# Patient Record
Sex: FEMALE | Race: ASIAN | NOT HISPANIC OR LATINO | Employment: FULL TIME | URBAN - METROPOLITAN AREA
[De-identification: names, ages, dates, MRNs, and addresses within clinical notes are randomized per-mention and may not be internally consistent; named-entity substitution may affect disease eponyms.]

---

## 2018-09-10 ENCOUNTER — OFFICE VISIT - HEALTHEAST (OUTPATIENT)
Dept: FAMILY MEDICINE | Facility: CLINIC | Age: 58
End: 2018-09-10

## 2018-09-10 ENCOUNTER — COMMUNICATION - HEALTHEAST (OUTPATIENT)
Dept: TELEHEALTH | Facility: CLINIC | Age: 58
End: 2018-09-10

## 2018-09-10 DIAGNOSIS — M79.641 BILATERAL HAND PAIN: ICD-10-CM

## 2018-09-10 DIAGNOSIS — M79.642 BILATERAL HAND PAIN: ICD-10-CM

## 2018-09-10 DIAGNOSIS — L20.9 ATOPIC DERMATITIS: ICD-10-CM

## 2018-09-10 DIAGNOSIS — M54.2 NECK PAIN: ICD-10-CM

## 2018-09-10 DIAGNOSIS — Z12.11 SCREEN FOR COLON CANCER: ICD-10-CM

## 2018-09-10 DIAGNOSIS — Z12.31 VISIT FOR SCREENING MAMMOGRAM: ICD-10-CM

## 2018-09-10 ASSESSMENT — MIFFLIN-ST. JEOR: SCORE: 1071.07

## 2018-09-18 ENCOUNTER — HOSPITAL ENCOUNTER (OUTPATIENT)
Dept: MAMMOGRAPHY | Facility: CLINIC | Age: 58
Discharge: HOME | End: 2018-09-18

## 2018-09-18 DIAGNOSIS — Z12.31 VISIT FOR SCREENING MAMMOGRAM: ICD-10-CM

## 2018-10-23 ENCOUNTER — OFFICE VISIT - HEALTHEAST (OUTPATIENT)
Dept: FAMILY MEDICINE | Facility: CLINIC | Age: 58
End: 2018-10-23

## 2018-10-23 DIAGNOSIS — K21.9 ACID REFLUX: ICD-10-CM

## 2018-10-23 DIAGNOSIS — Z00.00 ROUTINE HEALTH MAINTENANCE: ICD-10-CM

## 2018-10-23 DIAGNOSIS — Z13.1 SCREENING FOR DIABETES MELLITUS: ICD-10-CM

## 2018-10-23 DIAGNOSIS — Z13.220 SCREENING FOR LIPID DISORDERS: ICD-10-CM

## 2018-10-23 DIAGNOSIS — Z12.4 SCREENING FOR MALIGNANT NEOPLASM OF CERVIX: ICD-10-CM

## 2018-10-23 DIAGNOSIS — L30.9 HAND DERMATITIS: ICD-10-CM

## 2018-10-23 LAB
ALBUMIN SERPL-MCNC: 3.9 G/DL (ref 3.5–5)
ALP SERPL-CCNC: 85 U/L (ref 45–120)
ALT SERPL W P-5'-P-CCNC: 21 U/L (ref 0–45)
ANION GAP SERPL CALCULATED.3IONS-SCNC: 11 MMOL/L (ref 5–18)
AST SERPL W P-5'-P-CCNC: 24 U/L (ref 0–40)
BILIRUB SERPL-MCNC: 0.4 MG/DL (ref 0–1)
BUN SERPL-MCNC: 16 MG/DL (ref 8–22)
CALCIUM SERPL-MCNC: 9.7 MG/DL (ref 8.5–10.5)
CHLORIDE BLD-SCNC: 107 MMOL/L (ref 98–107)
CHOLEST SERPL-MCNC: 232 MG/DL
CO2 SERPL-SCNC: 26 MMOL/L (ref 22–31)
CREAT SERPL-MCNC: 0.77 MG/DL (ref 0.6–1.1)
ERYTHROCYTE [DISTWIDTH] IN BLOOD BY AUTOMATED COUNT: 11.7 % (ref 11–14.5)
FASTING STATUS PATIENT QL REPORTED: YES
GFR SERPL CREATININE-BSD FRML MDRD: >60 ML/MIN/1.73M2
GLUCOSE BLD-MCNC: 98 MG/DL (ref 70–125)
HCT VFR BLD AUTO: 42 % (ref 35–47)
HDLC SERPL-MCNC: 65 MG/DL
HGB BLD-MCNC: 14 G/DL (ref 12–16)
LDLC SERPL CALC-MCNC: 147 MG/DL
MCH RBC QN AUTO: 28.8 PG (ref 27–34)
MCHC RBC AUTO-ENTMCNC: 33.4 G/DL (ref 32–36)
MCV RBC AUTO: 86 FL (ref 80–100)
PLATELET # BLD AUTO: 302 THOU/UL (ref 140–440)
PMV BLD AUTO: 7.2 FL (ref 7–10)
POTASSIUM BLD-SCNC: 3.7 MMOL/L (ref 3.5–5)
PROT SERPL-MCNC: 7.1 G/DL (ref 6–8)
RBC # BLD AUTO: 4.88 MILL/UL (ref 3.8–5.4)
SODIUM SERPL-SCNC: 144 MMOL/L (ref 136–145)
TRIGL SERPL-MCNC: 99 MG/DL
WBC: 5.6 THOU/UL (ref 4–11)

## 2018-10-23 ASSESSMENT — MIFFLIN-ST. JEOR: SCORE: 1093.64

## 2018-10-24 LAB
25(OH)D3 SERPL-MCNC: 22.5 NG/ML (ref 30–80)
HPV SOURCE: NORMAL
HUMAN PAPILLOMA VIRUS 16 DNA: NEGATIVE
HUMAN PAPILLOMA VIRUS 18 DNA: NEGATIVE
HUMAN PAPILLOMA VIRUS FINAL DIAGNOSIS: NORMAL
HUMAN PAPILLOMA VIRUS OTHER HR: NEGATIVE
SPECIMEN DESCRIPTION: NORMAL

## 2018-10-29 ENCOUNTER — COMMUNICATION - HEALTHEAST (OUTPATIENT)
Dept: FAMILY MEDICINE | Facility: CLINIC | Age: 58
End: 2018-10-29

## 2019-02-05 ENCOUNTER — OFFICE VISIT - HEALTHEAST (OUTPATIENT)
Dept: FAMILY MEDICINE | Facility: CLINIC | Age: 59
End: 2019-02-05

## 2019-02-05 DIAGNOSIS — M79.641 BILATERAL HAND PAIN: ICD-10-CM

## 2019-02-05 DIAGNOSIS — M79.642 BILATERAL HAND PAIN: ICD-10-CM

## 2019-02-05 DIAGNOSIS — N39.0 URINARY TRACT INFECTION WITHOUT HEMATURIA, SITE UNSPECIFIED: ICD-10-CM

## 2019-02-05 DIAGNOSIS — R30.0 DYSURIA: ICD-10-CM

## 2019-02-05 DIAGNOSIS — K21.9 ACID REFLUX: ICD-10-CM

## 2019-02-05 LAB
ALBUMIN UR-MCNC: ABNORMAL MG/DL
APPEARANCE UR: CLEAR
BILIRUB UR QL STRIP: NEGATIVE
COLOR UR AUTO: YELLOW
GLUCOSE UR STRIP-MCNC: NEGATIVE MG/DL
HGB UR QL STRIP: ABNORMAL
KETONES UR STRIP-MCNC: NEGATIVE MG/DL
LEUKOCYTE ESTERASE UR QL STRIP: ABNORMAL
NITRATE UR QL: NEGATIVE
PH UR STRIP: 7.5 [PH] (ref 5–8)
SP GR UR STRIP: 1.02 (ref 1–1.03)
UROBILINOGEN UR STRIP-ACNC: ABNORMAL

## 2019-02-06 LAB — BACTERIA SPEC CULT: NO GROWTH

## 2019-02-07 ENCOUNTER — COMMUNICATION - HEALTHEAST (OUTPATIENT)
Dept: FAMILY MEDICINE | Facility: CLINIC | Age: 59
End: 2019-02-07

## 2020-02-03 ENCOUNTER — COMMUNICATION - HEALTHEAST (OUTPATIENT)
Dept: FAMILY MEDICINE | Facility: CLINIC | Age: 60
End: 2020-02-03

## 2020-06-24 ENCOUNTER — OFFICE VISIT - HEALTHEAST (OUTPATIENT)
Dept: FAMILY MEDICINE | Facility: CLINIC | Age: 60
End: 2020-06-24

## 2020-06-24 DIAGNOSIS — Z12.31 VISIT FOR SCREENING MAMMOGRAM: ICD-10-CM

## 2020-06-24 DIAGNOSIS — L30.9 HAND DERMATITIS: ICD-10-CM

## 2020-06-24 DIAGNOSIS — Z12.11 SCREEN FOR COLON CANCER: ICD-10-CM

## 2020-06-24 DIAGNOSIS — K21.9 ACID REFLUX: ICD-10-CM

## 2020-06-24 DIAGNOSIS — M79.642 BILATERAL HAND PAIN: ICD-10-CM

## 2020-06-24 DIAGNOSIS — M79.641 BILATERAL HAND PAIN: ICD-10-CM

## 2020-06-24 RX ORDER — BETAMETHASONE DIPROPIONATE 0.5 MG/G
CREAM TOPICAL DAILY
Qty: 30 G | Refills: 2 | Status: SHIPPED | OUTPATIENT
Start: 2020-06-24 | End: 2022-06-10

## 2020-06-24 RX ORDER — FAMOTIDINE 20 MG/1
20 TABLET, FILM COATED ORAL 2 TIMES DAILY
Qty: 90 TABLET | Refills: 1 | Status: SHIPPED | OUTPATIENT
Start: 2020-06-24 | End: 2022-06-10

## 2021-02-01 ENCOUNTER — COMMUNICATION - HEALTHEAST (OUTPATIENT)
Dept: FAMILY MEDICINE | Facility: CLINIC | Age: 61
End: 2021-02-01

## 2021-02-01 DIAGNOSIS — M79.641 BILATERAL HAND PAIN: ICD-10-CM

## 2021-02-01 DIAGNOSIS — M79.642 BILATERAL HAND PAIN: ICD-10-CM

## 2021-02-02 RX ORDER — NAPROXEN 500 MG/1
500 TABLET ORAL 2 TIMES DAILY WITH MEALS
Qty: 60 TABLET | Refills: 0 | Status: SHIPPED | OUTPATIENT
Start: 2021-02-02 | End: 2022-07-08

## 2021-03-23 ENCOUNTER — AMBULATORY - HEALTHEAST (OUTPATIENT)
Dept: NURSING | Facility: CLINIC | Age: 61
End: 2021-03-23

## 2021-03-26 ENCOUNTER — OFFICE VISIT - HEALTHEAST (OUTPATIENT)
Dept: FAMILY MEDICINE | Facility: CLINIC | Age: 61
End: 2021-03-26

## 2021-03-26 DIAGNOSIS — R35.0 URINARY FREQUENCY: ICD-10-CM

## 2021-03-26 DIAGNOSIS — N30.00 ACUTE CYSTITIS WITHOUT HEMATURIA: ICD-10-CM

## 2021-03-26 LAB
ALBUMIN UR-MCNC: NEGATIVE G/DL
APPEARANCE UR: ABNORMAL
BACTERIA #/AREA URNS HPF: ABNORMAL /[HPF]
BILIRUB UR QL STRIP: NEGATIVE
COLOR UR AUTO: YELLOW
GLUCOSE UR STRIP-MCNC: NEGATIVE MG/DL
HGB UR QL STRIP: ABNORMAL
KETONES UR STRIP-MCNC: NEGATIVE MG/DL
LEUKOCYTE ESTERASE UR QL STRIP: ABNORMAL
NITRATE UR QL: NEGATIVE
PH UR STRIP: 7 [PH] (ref 5–8)
RBC #/AREA URNS AUTO: ABNORMAL HPF
SP GR UR STRIP: 1.02 (ref 1–1.03)
SQUAMOUS #/AREA URNS AUTO: ABNORMAL LPF
UROBILINOGEN UR STRIP-ACNC: ABNORMAL
WBC #/AREA URNS AUTO: ABNORMAL HPF
WBC CLUMPS #/AREA URNS HPF: PRESENT /[HPF]

## 2021-03-28 LAB — BACTERIA SPEC CULT: ABNORMAL

## 2021-04-13 ENCOUNTER — AMBULATORY - HEALTHEAST (OUTPATIENT)
Dept: NURSING | Facility: CLINIC | Age: 61
End: 2021-04-13

## 2021-06-01 VITALS — BODY MASS INDEX: 22.26 KG/M2 | WEIGHT: 121 LBS | HEIGHT: 62 IN

## 2021-06-02 VITALS — HEIGHT: 63 IN | WEIGHT: 125.1 LBS | BODY MASS INDEX: 22.16 KG/M2

## 2021-06-02 VITALS — BODY MASS INDEX: 22.93 KG/M2 | WEIGHT: 127.4 LBS

## 2021-06-04 VITALS
DIASTOLIC BLOOD PRESSURE: 69 MMHG | SYSTOLIC BLOOD PRESSURE: 123 MMHG | OXYGEN SATURATION: 99 % | HEART RATE: 72 BPM | WEIGHT: 116.3 LBS | BODY MASS INDEX: 20.93 KG/M2

## 2021-06-05 VITALS
WEIGHT: 122 LBS | DIASTOLIC BLOOD PRESSURE: 80 MMHG | RESPIRATION RATE: 14 BRPM | BODY MASS INDEX: 21.96 KG/M2 | SYSTOLIC BLOOD PRESSURE: 122 MMHG | HEART RATE: 79 BPM | TEMPERATURE: 98.9 F | OXYGEN SATURATION: 99 %

## 2021-06-09 NOTE — PROGRESS NOTES
Assessment:   1. Hand dermatitis  Improved result with current medication. Recommend that patient continue with current topical corticosteroid and use hand lotion regularly. Patient verbalized understanding.   - betamethasone dipropionate (DIPROLENE) 0.05 % cream; Apply topically daily. To affected areas.  Dispense: 30 g; Refill: 2    2. Bilateral hand pain  Continue current NSAID  - naproxen (NAPROSYN) 500 MG tablet; Take 1 tablet (500 mg total) by mouth 2 (two) times a day with meals.  Dispense: 180 tablet; Refill: 1    3. Acid reflux  Stop zantac and start famotidine   - famotidine (PEPCID) 20 MG tablet; Take 1 tablet (20 mg total) by mouth 2 (two) times a day.  Dispense: 90 tablet; Refill: 1    4. Screen for colon cancer  Discussed need for screening  - Cologuard    5. Visit for screening mammogram  Discussed need for screening   - Mammo Screening Bilateral; Future     Plan:   Medications: topical steroid: corticosteroid.  Verbal patient instruction given.  Follow up in 3 weeks.     Subjective:   Jenni Bucio is a 59 y.o. female who presents for evaluation of a rash involving the hand. Rash started a few years ago. Lesions are clear, and raised in texture. Rash gets worse when dry and its itchy. Associated symptoms: none. Patient denies: abdominal pain, arthralgia, cough, decrease in appetite, decrease in energy level, fever, irritability, myalgia, nausea, sore throat and vomiting. Patient has not had contacts with similar rash. Patient has not had new exposures (soaps, lotions, laundry detergents, foods, medications, plants, insects or animals).    The following portions of the patient's history were reviewed and updated as appropriate: allergies, current medications, past family history, past medical history, past social history, past surgical history and problem list.    Review of Systems  A 12 point comprehensive review of systems was negative except as noted.      Objective:      /69   Pulse 72    Wt 116 lb 4.8 oz (52.8 kg)   SpO2 99%   BMI 20.93 kg/m    General:  alert, appears stated age and cooperative   Skin:  normal and dry scaley rash on both hands.

## 2021-06-16 NOTE — PROGRESS NOTES
Impression:  Acute cystitis.  No evidence for pyelonephritis or sepsis.    Plan:  Macrobid for 5 days, fluids, urine culture will be sent, follow-up with primary care      Chief Complaint:  Chief Complaint   Patient presents with     Fever     felt hot had uti in past and had a fever     Urinary Frequency     pt states she gets uti freq and would like to know if next time she feels like today if she can call in and just get a rx         HPI:   Jenni Bucio is a 60 y.o. female who presents to this clinic for the evaluation of urinary symptoms.  Patient complains of 2-day history of dysuria urgency and frequency.  No hematuria.  The first day she felt warm she like she had a fever but did not measure her temperature.  No fever since then.  No nausea or vomiting.  No abdominal pain or back pain.  She has had urinary tract infections in the past about 1/year and these symptoms remind her of her previous urinary tract infections.  History is obtained through a [place]  over the phone      PMH:   No past medical history on file.  No past surgical history on file.      ROS:  All other systems negative    Meds:    Current Outpatient Medications:      betamethasone dipropionate (DIPROLENE) 0.05 % cream, Apply topically daily. To affected areas., Disp: 30 g, Rfl: 2     famotidine (PEPCID) 20 MG tablet, Take 1 tablet (20 mg total) by mouth 2 (two) times a day., Disp: 90 tablet, Rfl: 1     naproxen (NAPROSYN) 500 MG tablet, Take 1 tablet (500 mg total) by mouth 2 (two) times a day with meals., Disp: 60 tablet, Rfl: 0     ranitidine (ZANTAC) 150 MG tablet, Take 1 tablet (150 mg total) by mouth 2 (two) times a day., Disp: 60 tablet, Rfl: 1        Social:  Social History     Socioeconomic History     Marital status: Single     Spouse name: Not on file     Number of children: Not on file     Years of education: Not on file     Highest education level: Not on file   Occupational History     Not on file   Social Needs      Financial resource strain: Not on file     Food insecurity     Worry: Not on file     Inability: Not on file     Transportation needs     Medical: Not on file     Non-medical: Not on file   Tobacco Use     Smoking status: Never Smoker     Smokeless tobacco: Never Used   Substance and Sexual Activity     Alcohol use: No     Drug use: Not on file     Sexual activity: Never   Lifestyle     Physical activity     Days per week: Not on file     Minutes per session: Not on file     Stress: Not on file   Relationships     Social connections     Talks on phone: Not on file     Gets together: Not on file     Attends Congregation service: Not on file     Active member of club or organization: Not on file     Attends meetings of clubs or organizations: Not on file     Relationship status: Not on file     Intimate partner violence     Fear of current or ex partner: Not on file     Emotionally abused: Not on file     Physically abused: Not on file     Forced sexual activity: Not on file   Other Topics Concern     Not on file   Social History Narrative     Not on file         Physical Exam:  Vitals:    03/26/21 1434   BP: 122/80   Patient Site: Right Arm   Patient Position: Sitting   Cuff Size: Adult Regular   Pulse: 79   Resp: 14   Temp: 98.9  F (37.2  C)   TempSrc: Oral   SpO2: 99%   Weight: 122 lb (55.3 kg)      Vital signs reviewed  Eyes: PERRL, EOMI  Head: Atraumatic and normocephalic  Abdomen: Nontender without mass, no CVA tenderness  Extremities: No tenderness or deformity  Skin: No lesions or rash  Neuro: Normal motor and sensory function in all extremities  Psych: Awake, alert, normally responsive      Results:    Recent Results (from the past 24 hour(s))   Urinalysis-UC if Indicated   Result Value Ref Range    Color, UA Yellow Colorless, Yellow, Straw, Light Yellow    Clarity, UA Slightly Cloudy (!) Clear    Glucose, UA Negative Negative    Protein, UA Negative Negative    Bilirubin, UA Negative Negative     Urobilinogen, UA 0.2 E.U./dL 0.2 E.U./dL, 1.0 E.U./dL    pH, UA 7.0 5.0 - 8.0    Blood, UA Trace (!) Negative    Ketones, UA Negative Negative    Nitrite, UA Negative Negative    Leukocytes, UA Moderate (!) Negative    Specific Gravity, UA 1.025 1.005 - 1.030    RBC, UA 0-2 None Seen, 0-2 hpf    WBC, UA 10-25 (!) None Seen, 0-5 hpf    WBC Clumps Present (!) None Seen    Bacteria, UA Few (!) None Seen    Squam Epithel, UA 0-5 None Seen, 0-5 lpf       No results found.      Tom Mccullough MD

## 2021-06-18 NOTE — PATIENT INSTRUCTIONS - HE
"Patient Instructions by Tom Mccullough MD at 3/26/2021  2:30 PM     Author: Tom Mccullough MD Service: -- Author Type: Physician    Filed: 3/26/2021  3:02 PM Encounter Date: 3/26/2021 Status: Signed    : Tom Mccullough MD (Physician)         Patient Education     Tess?m Trùng Bàng Maris, Ph? N? [Bladder Infection: Female, Adult]  Tess?m trùng bàng maris (\"viêm bàng maris\" ho?c \"UTI\") th? ?ng gây nên vi?c mu? n ?i ti ?u không ng?t và rát khi ti?u ti?n. N? ?c ti?u có th? ? ?c, có mùi ho?c s?m. Có th? ?au b ? ng d? ?i. S? tess?m trùng bàng maris x?y ra khi vi khu?n t? vùng âm ? ?o xâm nh?p vào l? h? c?a bàng maris (ni? u ? ?o). ?i ?u này có th? x?y ra do giao h?p, m?c áo qu?n ch?t, m? t n? ?c ho?c các y?u t? khác.    Ch?m Sóc T?i Anne:  1. U?ng tess? u n? ?c (ít nh?t là 6-8 ly m?i ngày), tr? khi quý v? ph?i h?n ch? ch?t l?ng vì các lý do y david khác. ?i ?u này s? ? ?y thu?c vào anuj h? th?ng ni?u c?a quý v? và t?ng vi khu?n ra kh? i c? th ? c?a quý v?.  2. Tránh giao h? p cho ? ?n khi các tri?u ch?ng c?a quý v? ? ã bi?n m?t.  3. Tránh cà phê in, r? ?u và th? c ?n có anne v?. Nh? ng ?i ?u này có th? kích thích bàng maris.  4. Tess?m trùng bàng maris ?? ?c ?i ?u tr? b?ng thu?c tr? sinh. Quý v? c?ng có th? ?? ?c cho dùng Pyridium (g?i ana tên hi? u th? ?ng là phenazopyridine) ? ? gi?m c?m giác rát. Thu?c này s? làm cho n? ?c ti?u có màu cam t ?? i. N? ?c ti?u màu cam có th? gây v?t màu dính trên áo qu?n. Quý v? có th? m ang b?ng v ? sinh hay b ? ng lót ? ? b?o v? áo qu?n.  Phòng Ng?a Các S? Tess? M TRÙNG ANUJ T??NG FERRARA:  1. Luôn appiah chùi t? tr? ?c ra maicol maicol khi ? ?i ti?n.  2. Gi? cho vùng sinh d?c s?ch và khô.  3. U?ng th?t tess?u ch?t l?ng m?i ngày ? ? tránh m? t n? ?c anuj c ? th ?.  4. C? mireya ng? ?i b?n tình ph?i r?a ráy tr??c khi giao h?p.  5. Ti?u ti?n ngay maicol khi giao h?p ? ? làm s?ch bàng maris.  6. M? c ? ? lót b?ng cô tông ho?c v? kéo ngang b?ng b?ng cô tông ; tránh ? ?ng m?c qu?n ch?t  7. N?u quý " v? ?ang dùng thu ?c ng?a armin và b? laura?m trùng bàng maris th? ?ng xuyên, hãy th?o vasile?n v?i bác s? c?a quý v?.  Ti?p T?c Manuel Dõi:  Tr? l? i c? s ? này ho? c ? ?n khám bác s? c?a quý v? n?u T?T C? các tri?u ch?ng không h?t maicol ba ngày ?i ?u tr?.  N?u x?y ra b?t c? ?i?u nào maicol ?ây hãy L?P T?C ?I?U TR? Y T?:    Sô?t 100.4 F (38 C) ho??c petersen h?n, ho??c manuel chi? dâ?n cu?a ba?c si? cu?a cresencio? vi?    Không ? ? h?n vào ngày th ? ba ?? ? c ?i ?u tr?    ?au l?ng ho ? c ?au b ? ng anne t?ng    Ói m?a laura?u l?n; không c?m ?? ?c thu?c    Latanya y?u, chóng m?t ho?c ng?t x?u    Ti?t d? ch âm ? ?o    ?au , ? ? ho? c s?ng mép âm h? ( vùng âm ? ?o bên ngoài)  Date Last Reviewed: 4/8/2014 2000-2017 [employer]. [address] [place] [address], [place] [address]. All rights reserved. This information is not intended as a substitute for professional medical care. Always follow your healthcare professional's instructions.

## 2021-06-20 NOTE — PROGRESS NOTES
Assessment/Plan:     1. Bilateral hand pain  I suspect osteoarthritis given the location of the pain as well as her work environment.  Will start patient on Naproxen twice daily with food.  Stop the Ibuprofen.  Stretching before and after work.   - naproxen (NAPROSYN) 500 MG tablet; Take 1 tablet (500 mg total) by mouth 2 (two) times a day with meals.  Dispense: 180 tablet; Refill: 1    2. Neck pain  Given her hand pain, there could be some symptoms of stenosis.  Will start with NSAIDs, heat, and stretching.  If pain continues, will consider a cervical MRI.    3. Atopic dermatitis  Encourage good hydration of the skin, especially if she is washing her hands and been in contact with water frequently.  We will trial some TCA ointment to the thumb joint.  - triamcinolone (KENALOG) 0.1 % ointment; Apply topically 2 (two) times a day.  Dispense: 30 g; Refill: 0    4. Screen for colon cancer  - Ambulatory referral for Colonoscopy    5. Visit for screening mammogram  - Mammo Screening Bilateral; Future      Subjective:     Jenni Bucio is a 58 y.o. female accompanied by an  who presents with complaints of bilateral hand pain and neck pain.  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  Patient complains of pain in the distal joint of the right thumb.  She also has pain in the MCP joint of her left index finger.  Pain is intermittent, and some days are better than others.  She has noticed some swelling of the joints.  At times, she has difficulty grasping things with her left hand.  She is left-hand dominant.  Patient does not have pain in the left thumb, but complains of red and itchy skin to this area.  She has tried a variety of creams and lotions without significant improvement.  Patient denies any pain in the rest of her fingers or her wrists.  Her neck pain is intermittent.  States it starts at the base of her skull, and radiates into the upper  "trapezius muscles.  States it feels very tight, stiff, and painful.  Massage is very helpful.  She will sometimes wake up with numbness in her hands, and this happens more often at night.  Patient is currently taking ibuprofen as needed for both the neck and hand pain.  It does help a little, and she is not needing to take on a daily basis.    Patient has not been in for a physical in a number of years.  It does look like she had a mammogram about 2 years ago.        The following portions of the patient's history were reviewed and updated as appropriate: allergies, current medications, past family history, past medical history, past social history, past surgical history and problem list.    Review of Systems  A comprehensive review of systems was performed and was otherwise negative    Objective:     /72 (Patient Site: Right Arm, Patient Position: Sitting, Cuff Size: Adult Regular)  Pulse 70  Ht 5' 2.25\" (1.581 m)  Wt 121 lb (54.9 kg)  BMI 21.95 kg/m2    General Appearance: Alert, cooperative, no distress, appears stated age  Neck: Cervical spine is straight and nontender. Mild pain to the cervical paraspinal muscles. Normal ROM.   Extremities: Pain palpated to the right DIP joint of the thumb - no significant swelling or redness.  Pain and swelling to the left, 2nd MCP joint.  strength grossly normal and equal bilaterally. No wrist pain. Normal ROM.   Skin: dry, erythematous, and excoriated skin to the left dorsal thumb. No drainage or swelling.      Vernell Wheatley NP-C    "

## 2021-06-20 NOTE — LETTER
Letter by Vernell Wheatley NP at      Author: Vernell Wheatley NP Service: -- Author Type: --    Filed:  Encounter Date: 2/3/2020 Status: (Other)         Jenni Bucio  [address]  [place] [address]             February 3, 2020         Dear Ms. Bucio,    Our records show that you are due for a colonoscopy.  We do want to inform you that there are a couple of other options besides the traditional colonoscopy that we offer.  If you would like to do the traditional colonoscopy, please contact a [facility] near you.  If you would like to do one of the other options available to you, please let you primary doctor know and they will get that ordered, or let you know if an office visit is needed. If you have had any of these done at another facility, please arrange for us to receive those records so we can update your chart.      Please call with questions or contact us using [facility].    Sincerely,        Electronically signed by Vernell Wheatley NP

## 2021-06-21 NOTE — PROGRESS NOTES
FEMALE PREVENTATIVE EXAM    Assessment and Plan:     1. Routine health maintenance  - 2(CBC w/o Differential)  - Vitamin D, Total (25-Hydroxy)    2. Screening for diabetes mellitus  - Comprehensive Metabolic Panel    3. Screening for lipid disorders  - Lipid Frederic FASTING    4. Acid reflux  Epigastric pain and bloating possibly associated acid reflux.  Trial of Zantac twice daily before meals.  Discussed dietary and lifestyle modifications.    - ranitidine (ZANTAC) 150 MG tablet; Take 1 tablet (150 mg total) by mouth 2 (two) times a day.  Dispense: 60 tablet; Refill: 1    5. Screening for malignant neoplasm of cervix  - Gynecologic Cytology (PAP Smear)    6. Hand dermatitis  Some improvement with TCA cream.  Will trial betamethasone once daily.    - betamethasone dipropionate (DIPROLENE) 0.05 % cream; Apply topically daily. To affected areas.  Dispense: 30 g; Refill: 0      Next follow up:  Return in about 1 year (around 10/23/2019).    Immunization Review  Adult Imm Review: No immunizations due today    I discussed the following with the patient:   Adult Healthy Living: Importance of regular exercise  Healthy nutrition  Getting adequate sleep    I have had an Advance Directives discussion with the patient.    Subjective:   Chief Complaint: Jenni Bucio is an 58 y.o. female here for a preventative health visit.     HPI: Patient works at a nail salon.  The last time I saw her, she was experiencing hand pain and a rash on her hand.  She was started on naproxen for the hand pain.  This has been very beneficial.  She continues to have intermittent skin dryness and redness to her thumb areas.  She tried the triamcinolone cream, but this did not seem to clear it up.    Patient complains of epigastric pain, abdominal bloating, and some heartburn symptoms for the last 2 months or so.  States she will feel bloated before and after eating.  Denies any cough or sore throat.  No treatments tried at home.    She reports a  "lot of mucus in her throat that she feels she needs to clear up.  She denies any fever, sinus congestion, sore throat, or chronic cough.  No fevers.  Symptoms present for the last 3 weeks or so.  No shortness of breath or chest pain.    Patient believes her last Pap was approximately 18 years ago.  She is no longer having menstrual periods.  Denies any abnormal vaginal discharge or bleeding.    Healthy Habits  Are you taking a daily aspirin? No  Do you typically exercising at least 40 min, 3-4 times per week?  NO  Do you usually eat at least 4 servings of fruit and vegetables a day, include whole grains and fiber and avoid regularly eating high fat foods? NO  Have you had an eye exam in the past two years? NO  Do you see a dentist twice per year? NO  Do you have any concerns regarding sleep? YES    Safety Screen  If you own firearms, are they secured in a locked gun cabinet or with trigger locks? The patient does not own any firearms  Do you feel you are safe where you are living?: Yes (10/23/2018  7:47 AM)  Do you feel you are safe in your relationship(s)?: Yes (10/23/2018  7:47 AM)    Review of Systems:  Please see above.  The rest of the review of systems are negative for all systems.     Pap History:   No - age 30-65 PAP every 3 years recommended  Cancer Screening       Status Date      PAP SMEAR Overdue 8/5/1989     COLONOSCOPY Overdue 8/5/2009     MAMMOGRAM Next Due 9/18/2020      Done 9/18/2018 MAMMO SCREENING BILATERAL     Patient has more history with this topic...          Patient Care Team:  Vernell Wheatley NP as PCP - General (Family Medicine)        History     Reviewed By Date/Time Sections Reviewed    Justine Saldana MA 10/23/2018  7:47 AM Tobacco            Objective:   Vital Signs:   Visit Vitals     /68 (Patient Site: Right Arm, Patient Position: Sitting, Cuff Size: Adult Regular)     Pulse 80     Temp 98.2  F (36.8  C) (Oral)     Ht 5' 2.5\" (1.588 m)     Wt 125 lb 1.6 oz (56.7 kg)     BMI " 22.52 kg/m2          PHYSICAL EXAM  General Appearance: Alert, cooperative, no distress, appears stated age  Head: Normocephalic, without obvious abnormality, atraumatic  Eyes: PERRL, conjunctiva/corneas clear, EOM's intact  Ears: Normal TM's and external ear canals, both ears  Nose: Nares normal, septum midline,mucosa normal, no drainage  Throat: Lips, mucosa, and tongue normal; teeth and gums normal  Neck: Supple, symmetrical, trachea midline, no adenopathy;  thyroid: not enlarged, symmetric, no tenderness/mass/nodules  Back: no CVA tenderness  Lungs: Clear to auscultation bilaterally, respirations unlabored  Breasts: No breast masses, tenderness, asymmetry, or nipple discharge.  Heart: Regular rate and rhythm, S1 and S2 normal, no murmur, rub, or gallop  Abdomen: Soft, non-tender, bowel sounds active all four quadrants,  no masses, no organomegaly  Pelvic: Normally developed genitalia with no external lesions or eruptions. Vagina and cervix show no lesions, inflammation, discharge or tenderness. No cystocele, No rectocele. No adnexal mass or tenderness.  Extremities: Extremities normal, atraumatic, no cyanosis or edema  Skin: Skin color, texture, turgor normal, or lesions. Erythematous, patchy skin to the bilateral skin of thumbs. No drainage or lesions.   Lymph nodes: Cervical, supraclavicular, and axillary nodes normal  Neurologic: Normal   Psychologic: appropriate affective, answers all of my questions appropriately. No hallucinations, delusion, or suicidal ideations.    RODRIGO Arevalo

## 2021-06-23 NOTE — TELEPHONE ENCOUNTER
----- Message from Vernell Wheatley NP sent at 2/7/2019  7:42 AM CST -----  Please call patient.    Her urine culture was negative.  If her symptoms are improving, please instruct her to finish the antibiotics.  Follow up with any continued symptoms after she completes this.    Vernell Wheatley NP

## 2021-06-23 NOTE — PROGRESS NOTES
Assessment/Plan:     1. Urinary tract infection without hematuria, site unspecified  UA positive.  Prescribed Macrobid two times a day x 5 days.  Urine culture pending; will notify if a change in antibiotic is indicated.  Push fluids.  Stop the OTC Urimax.   - Culture, Urine  - nitrofurantoin, macrocrystal-monohydrate, (MACROBID) 100 MG capsule; Take 1 capsule (100 mg total) by mouth 2 (two) times a day for 5 days.  Dispense: 10 capsule; Refill: 0    2. Dysuria  - Urinalysis-UC if Indicated    3. Bilateral hand pain  Naproxen working well; continue.   - naproxen (NAPROSYN) 500 MG tablet; Take 1 tablet (500 mg total) by mouth 2 (two) times a day with meals.  Dispense: 180 tablet; Refill: 1    4. Acid reflux  Zantac working well; continue.  - ranitidine (ZANTAC) 150 MG tablet; Take 1 tablet (150 mg total) by mouth 2 (two) times a day.  Dispense: 60 tablet; Refill: 1        Subjective:     Jenni Bucio is a 58 y.o. female who presents with complaints of dysuria x 4 days.  Associated symptoms include lower abdominal pain, which is worse after urination.  Denies any fever, hematuria, or urinary frequency/urgency.  No back pain or N/V/D.  Patient was treated for a UTI last summer.  She has been hydrating and taking an OTC medication called Urimax.    Patient is also requesting refills of her Naproxen and Zantac.  She is taking the Naproxen as needed for bilateral hand pain.  This is working very well for her.  She enjoys sewing and also works as a .  Patient utilizes the Zantac for acid reflux; working well.     The following portions of the patient's history were reviewed and updated as appropriate: allergies, current medications.    Review of Systems  A comprehensive review of systems was performed and was otherwise negative    Objective:     /70   Pulse 80   Temp 98.4  F (36.9  C)   Wt 127 lb 6.4 oz (57.8 kg)   BMI 22.93 kg/m      General Appearance: Alert, cooperative, no distress, appears  stated age  Back: no CVA tenderness  Lungs: Clear to auscultation bilaterally, respirations unlabored  Heart: Regular rate and rhythm, S1 and S2 normal, no murmur, rub, or gallop   Abdomen: Soft, suprapubic tenderness, bowel sounds active all four quadrants,  no masses, no organomegaly      Vernell Wheatley, NP

## 2022-06-10 ENCOUNTER — OFFICE VISIT (OUTPATIENT)
Dept: INTERNAL MEDICINE | Facility: CLINIC | Age: 62
End: 2022-06-10
Payer: COMMERCIAL

## 2022-06-10 VITALS
HEART RATE: 70 BPM | SYSTOLIC BLOOD PRESSURE: 112 MMHG | WEIGHT: 112.7 LBS | TEMPERATURE: 98.1 F | DIASTOLIC BLOOD PRESSURE: 62 MMHG | BODY MASS INDEX: 20.28 KG/M2 | OXYGEN SATURATION: 99 %

## 2022-06-10 DIAGNOSIS — R63.4 WEIGHT LOSS: ICD-10-CM

## 2022-06-10 DIAGNOSIS — F41.9 ANXIETY: ICD-10-CM

## 2022-06-10 DIAGNOSIS — R00.2 PALPITATIONS: ICD-10-CM

## 2022-06-10 DIAGNOSIS — R07.89 ATYPICAL CHEST PAIN: ICD-10-CM

## 2022-06-10 DIAGNOSIS — R53.82 CHRONIC FATIGUE: Primary | ICD-10-CM

## 2022-06-10 DIAGNOSIS — K21.9 GASTROESOPHAGEAL REFLUX DISEASE WITHOUT ESOPHAGITIS: ICD-10-CM

## 2022-06-10 DIAGNOSIS — K21.00 GASTROESOPHAGEAL REFLUX DISEASE WITH ESOPHAGITIS WITHOUT HEMORRHAGE: ICD-10-CM

## 2022-06-10 LAB
ALBUMIN SERPL-MCNC: 3.7 G/DL (ref 3.5–5)
ALBUMIN UR-MCNC: NEGATIVE MG/DL
ALP SERPL-CCNC: 77 U/L (ref 45–120)
ALT SERPL W P-5'-P-CCNC: 12 U/L (ref 0–45)
ANION GAP SERPL CALCULATED.3IONS-SCNC: 11 MMOL/L (ref 5–18)
APPEARANCE UR: CLEAR
AST SERPL W P-5'-P-CCNC: 18 U/L (ref 0–40)
ATRIAL RATE - MUSE: 66 BPM
BACTERIA #/AREA URNS HPF: ABNORMAL /HPF
BASOPHILS # BLD AUTO: 0 10E3/UL (ref 0–0.2)
BASOPHILS NFR BLD AUTO: 1 %
BILIRUB SERPL-MCNC: 0.5 MG/DL (ref 0–1)
BILIRUB UR QL STRIP: NEGATIVE
BUN SERPL-MCNC: 19 MG/DL (ref 8–22)
C REACTIVE PROTEIN LHE: 0.2 MG/DL (ref 0–0.8)
CALCIUM SERPL-MCNC: 9.7 MG/DL (ref 8.5–10.5)
CHLORIDE BLD-SCNC: 107 MMOL/L (ref 98–107)
CO2 SERPL-SCNC: 27 MMOL/L (ref 22–31)
COLOR UR AUTO: YELLOW
CREAT SERPL-MCNC: 0.78 MG/DL (ref 0.6–1.1)
DEPRECATED CALCIDIOL+CALCIFEROL SERPL-MC: 26 UG/L (ref 20–75)
DIASTOLIC BLOOD PRESSURE - MUSE: NORMAL MMHG
EOSINOPHIL # BLD AUTO: 0.1 10E3/UL (ref 0–0.7)
EOSINOPHIL NFR BLD AUTO: 1 %
ERYTHROCYTE [DISTWIDTH] IN BLOOD BY AUTOMATED COUNT: 11.8 % (ref 10–15)
ERYTHROCYTE [SEDIMENTATION RATE] IN BLOOD BY WESTERGREN METHOD: 9 MM/HR (ref 0–20)
GFR SERPL CREATININE-BSD FRML MDRD: 85 ML/MIN/1.73M2
GLUCOSE BLD-MCNC: 95 MG/DL (ref 70–125)
GLUCOSE UR STRIP-MCNC: NEGATIVE MG/DL
HCT VFR BLD AUTO: 43.9 % (ref 35–47)
HGB BLD-MCNC: 14.4 G/DL (ref 11.7–15.7)
HGB UR QL STRIP: ABNORMAL
IMM GRANULOCYTES # BLD: 0 10E3/UL
IMM GRANULOCYTES NFR BLD: 0 %
INTERPRETATION ECG - MUSE: NORMAL
KETONES UR STRIP-MCNC: NEGATIVE MG/DL
LEUKOCYTE ESTERASE UR QL STRIP: ABNORMAL
LYMPHOCYTES # BLD AUTO: 1.6 10E3/UL (ref 0.8–5.3)
LYMPHOCYTES NFR BLD AUTO: 22 %
MCH RBC QN AUTO: 29.2 PG (ref 26.5–33)
MCHC RBC AUTO-ENTMCNC: 32.8 G/DL (ref 31.5–36.5)
MCV RBC AUTO: 89 FL (ref 78–100)
MONOCYTES # BLD AUTO: 0.4 10E3/UL (ref 0–1.3)
MONOCYTES NFR BLD AUTO: 6 %
NEUTROPHILS # BLD AUTO: 5.1 10E3/UL (ref 1.6–8.3)
NEUTROPHILS NFR BLD AUTO: 70 %
NITRATE UR QL: NEGATIVE
P AXIS - MUSE: 71 DEGREES
PH UR STRIP: 6.5 [PH] (ref 5–8)
PLATELET # BLD AUTO: 293 10E3/UL (ref 150–450)
POTASSIUM BLD-SCNC: 4 MMOL/L (ref 3.5–5)
PR INTERVAL - MUSE: 120 MS
PROT SERPL-MCNC: 7.4 G/DL (ref 6–8)
QRS DURATION - MUSE: 74 MS
QT - MUSE: 426 MS
QTC - MUSE: 446 MS
R AXIS - MUSE: 30 DEGREES
RBC # BLD AUTO: 4.93 10E6/UL (ref 3.8–5.2)
RBC #/AREA URNS AUTO: ABNORMAL /HPF
SODIUM SERPL-SCNC: 145 MMOL/L (ref 136–145)
SP GR UR STRIP: 1.01 (ref 1–1.03)
SQUAMOUS #/AREA URNS AUTO: ABNORMAL /LPF
SYSTOLIC BLOOD PRESSURE - MUSE: NORMAL MMHG
T AXIS - MUSE: 16 DEGREES
TSH SERPL DL<=0.005 MIU/L-ACNC: 1.42 UIU/ML (ref 0.3–5)
UROBILINOGEN UR STRIP-ACNC: 0.2 E.U./DL
VENTRICULAR RATE- MUSE: 66 BPM
WBC # BLD AUTO: 7.2 10E3/UL (ref 4–11)
WBC #/AREA URNS AUTO: ABNORMAL /HPF

## 2022-06-10 PROCEDURE — 80050 GENERAL HEALTH PANEL: CPT | Performed by: INTERNAL MEDICINE

## 2022-06-10 PROCEDURE — 93010 ELECTROCARDIOGRAM REPORT: CPT | Performed by: INTERNAL MEDICINE

## 2022-06-10 PROCEDURE — 86140 C-REACTIVE PROTEIN: CPT | Performed by: INTERNAL MEDICINE

## 2022-06-10 PROCEDURE — 85652 RBC SED RATE AUTOMATED: CPT | Performed by: INTERNAL MEDICINE

## 2022-06-10 PROCEDURE — 99213 OFFICE O/P EST LOW 20 MIN: CPT | Performed by: INTERNAL MEDICINE

## 2022-06-10 PROCEDURE — 82306 VITAMIN D 25 HYDROXY: CPT | Performed by: INTERNAL MEDICINE

## 2022-06-10 PROCEDURE — 81001 URINALYSIS AUTO W/SCOPE: CPT | Performed by: INTERNAL MEDICINE

## 2022-06-10 PROCEDURE — 36415 COLL VENOUS BLD VENIPUNCTURE: CPT | Performed by: INTERNAL MEDICINE

## 2022-06-10 PROCEDURE — 93005 ELECTROCARDIOGRAM TRACING: CPT | Performed by: INTERNAL MEDICINE

## 2022-06-10 RX ORDER — FAMOTIDINE 20 MG/1
20 TABLET, FILM COATED ORAL 2 TIMES DAILY
Qty: 90 TABLET | Refills: 1 | Status: SHIPPED | OUTPATIENT
Start: 2022-06-10 | End: 2022-07-28

## 2022-06-10 ASSESSMENT — PATIENT HEALTH QUESTIONNAIRE - PHQ9
SUM OF ALL RESPONSES TO PHQ QUESTIONS 1-9: 2
SUM OF ALL RESPONSES TO PHQ QUESTIONS 1-9: 2
10. IF YOU CHECKED OFF ANY PROBLEMS, HOW DIFFICULT HAVE THESE PROBLEMS MADE IT FOR YOU TO DO YOUR WORK, TAKE CARE OF THINGS AT HOME, OR GET ALONG WITH OTHER PEOPLE: NOT DIFFICULT AT ALL

## 2022-06-10 NOTE — PATIENT INSTRUCTIONS
Symptom directed visit for this historically healthy 61-year-old [place] woman who comes to clinic accompanied by her sister, and has been feeling unwell for the past 3 months and has lost about 20 pounds of weight.    Ms. Mckeon has a history of gastroesophageal reflux and, and also osteoarthritis of her hands and probably some involvement of the neck.  She works in a nail salon and as a seamstress.    She reports that her health started to change about 3 months ago, when she has been experiencing     Fatigue, dizziness, and intermittent pain in the right side of the chest and into the shoulder, some shortness of breath, poor sleeping, and most concerningly has lost she says 20 pounds    Her physical examination is unremarkable, although I do note that she is thin.  Her thyroid seems palpably normal.    I told her that I want to run a broad set of tests to check her organ systems, so we will start with an electrocardiogram, comprehensive metabolic panel, blood cell counts, TSH thyroid test, vitamin D level, sedimentation rate, urinalysis (has history of UTI)    I would like to see her back in the clinic in 2 or 3 weeks to review these test results.    She also tell me that she is experiencing  Stress and anxiety, from some sort of family matter  which she declined to elaborate on.  As I asked her about this topic, she did become a bit tearful.    Sleep difficulty, which could be from anxiety or stress, but of course we do need to evaluate for potential medical causes    History of elevated cholesterol, with an LDL of 147 measured in 2018  No history of smoking, she reports alcohol consumption 0    Gastroesophageal reflux, for which she takes famotidine, and she does report some intermittent difficulty with swallowing    Osteoarthritis of her hands, with visible bony hypertrophy that I can see, and also some neck pain which I believe probably represents some degenerative disc disease  Some of this could be overuse  from her occupation which is also lot of manipulation of the hands  She has been using naproxen, which I would like her to minimize use, because it can be irritating to the stomach.    Immunization History   Administered Date(s) Administered    COVID-19,PF,Pfizer (12+ Yrs) 03/23/2021, 04/13/2021    COVID-19,PF,Pfizer 12+ Yrs (2022 and After) 05/02/2022    DT (PEDS <7y) 03/27/2006    FLU 6-35 months 09/27/2010    Flu, Unspecified 10/22/1998    Influenza Vaccine, 6+MO IM (QUADRIVALENT W/PRESERVATIVES) 09/10/2018    TD (ADULT, 7+) 09/10/2018    Td,adult,historic,unspecified 03/27/2006    Tdap (Adacel,Boostrix) 03/27/2006

## 2022-06-10 NOTE — PROGRESS NOTES
Office Visit - New Patient   Jenni Bucio   61 year old  female    Date of visit: 6/10/2022  Physician: NAYELI MARTINES MD, MD     Assessment and Plan    Symptom directed visit for this historically healthy 61-year-old Pashto woman who comes to clinic accompanied by her sister, and has been feeling unwell for the past 3 months and has lost about 20 pounds of weight.    Ms. Mckeon has a history of gastroesophageal reflux and, and also osteoarthritis of her hands and probably some involvement of the neck.  She works in a nail salon and as a seamstress.    She reports that her health started to change about 3 months ago, when she has been experiencing     Fatigue, dizziness, and intermittent pain in the right side of the chest and into the shoulder, some shortness of breath, poor sleeping, and most concerningly has lost she says 20 pounds    Her physical examination is unremarkable, although I do note that she is thin.  Her thyroid seems palpably normal.    I told her that I want to run a broad set of tests to check her organ systems, so we will start with an electrocardiogram, comprehensive metabolic panel, blood cell counts, TSH thyroid test, vitamin D level, sedimentation rate, urinalysis (has history of UTI)    I would like to see her back in the clinic in 2 or 3 weeks to review these test results.    She also tell me that she is experiencing  Stress and anxiety, from some sort of family matter  which she declined to elaborate on.  As I asked her about this topic, she did become a bit tearful.    Sleep difficulty, which could be from anxiety or stress, but of course we do need to evaluate for potential medical causes    History of elevated cholesterol, with an LDL of 147 measured in 2018  No history of smoking, she reports alcohol consumption 0    Gastroesophageal reflux, for which she takes famotidine, and she does report some intermittent difficulty with swallowing    Osteoarthritis of her hands, with  visible bony hypertrophy that I can see, and also some neck pain which I believe probably represents some degenerative disc disease  Some of this could be overuse from her occupation which is also lot of manipulation of the hands  She has been using naproxen, which I would like her to minimize use, because it can be irritating to the stomach.    Immunization History   Administered Date(s) Administered     COVID-19,PF,Pfizer (12+ Yrs) 03/23/2021, 04/13/2021     COVID-19,PF,Pfizer 12+ Yrs (2022 and After) 05/02/2022     DT (PEDS <7y) 03/27/2006     FLU 6-35 months 09/27/2010     Flu, Unspecified 10/22/1998     Influenza Vaccine, 6+MO IM (QUADRIVALENT W/PRESERVATIVES) 09/10/2018     TD (ADULT, 7+) 09/10/2018     Td,adult,historic,unspecified 03/27/2006     Tdap (Adacel,Boostrix) 03/27/2006       ---------------------------------------------------------------------------------------------------------------------------  Chief Complaint   Chief Complaint   Patient presents with     Multiple Concerns     Fatigue, dizzy, dizzy, RT side of chest and shoulder pain (had to stop driving due too), main concern is breathing, X 2-3 months, 2-3 times a week these symptoms occur.         ---------------------------------------------------------------------------------------------------------------------------  History of Present Illness  This 61 year old old    Symptom directed visit for this historically healthy 61-year-old [place] woman who comes to clinic accompanied by her sister, and has been feeling unwell for the past 3 months and has lost about 20 pounds of weight.    Ms. Mckeon has a history of gastroesophageal reflux and, and also osteoarthritis of her hands and probably some involvement of the neck.  She works in a nail salon and as a seamstress.    She reports that her health started to change about 3 months ago, when she has been experiencing     Fatigue, dizziness, and intermittent pain in the right side of the chest  and into the shoulder, some shortness of breath, poor sleeping, and most concerningly has lost she says 20 pound      Wt Readings from Last 3 Encounters:   06/10/22 51.1 kg (112 lb 11.2 oz)   03/26/21 55.3 kg (122 lb)   06/24/20 52.8 kg (116 lb 4.8 oz)     BP Readings from Last 3 Encounters:   06/10/22 112/62   03/26/21 122/80   06/24/20 123/69       Lab Results   Component Value Date    WBC 5.6 10/23/2018    HGB 14.0 10/23/2018    HCT 42.0 10/23/2018     10/23/2018    CHOL 232 (H) 10/23/2018    TRIG 99 10/23/2018    HDL 65 10/23/2018    ALT 21 10/23/2018    AST 24 10/23/2018     10/23/2018    BUN 16 10/23/2018    CO2 26 10/23/2018           Review of Systems: A comprehensive review of systems was negative except as noted.  ---------------------------------------------------------------------------------------------------------------------------    Medications and Allergies   Current Outpatient Medications   Medication Sig Dispense Refill     famotidine (PEPCID) 20 MG tablet Take 1 tablet (20 mg) by mouth 2 times daily 90 tablet 1     naproxen (NAPROSYN) 500 MG tablet [NAPROXEN (NAPROSYN) 500 MG TABLET] Take 1 tablet (500 mg total) by mouth 2 (two) times a day with meals. 60 tablet 0     No Known Allergies     Active Ambulatory Problems     Diagnosis Date Noted     No Active Ambulatory Problems     Resolved Ambulatory Problems     Diagnosis Date Noted     Urosepsis 09/04/2014     No Additional Past Medical History     No past surgical history on file.   No past medical history on file.     Family and Social History   No family history on file.     Social History     Tobacco Use     Smoking status: Never Smoker     Smokeless tobacco: Never Used   Vaping Use     Vaping Use: Never used   Substance Use Topics     Alcohol use: No        Physical Exam   General Appearance:       /62   Pulse 70   Wt 51.1 kg (112 lb 11.2 oz)   SpO2 99%   BMI 20.28 kg/m      Appears generally well but is thin.  When  asked her questions about stress and anxiety, she became tearful and said there was a family matter, but she did not wish to elaborate  Oropharynx clear  No scleral icterus  No lymphadenopathy, thyroid palpably normal  Lungs clear  Heart regular rate rhythm no murmur  Abdomen nontender, no enlargement of liver or spleen  Extremities no edema         Additional Information        NAYELI MARTINES MD, MD  Internal Medicine

## 2022-06-10 NOTE — LETTER
Paola 10, 2022      Nanette Bucio  [address] [address]        Dear ,    Your test results all look good.    The urinalysis did have a couple of red cells but no inflammatory white cells, and I do not think there is any sign of infection.    The electrocardiogram was normal    All the blood tests look good, including the metabolic panel, blood cell counts, TSH thyroid test, vitamin D level, and inflammation blood tests (sedimentation rate and CRP).    Best I can tell, your organ systems are working properly.    Resulted Orders   Comprehensive metabolic panel (BMP + Alb, Alk Phos, ALT, AST, Total. Bili, TP)   Result Value Ref Range    Sodium 145 136 - 145 mmol/L    Potassium 4.0 3.5 - 5.0 mmol/L    Chloride 107 98 - 107 mmol/L    Carbon Dioxide (CO2) 27 22 - 31 mmol/L    Anion Gap 11 5 - 18 mmol/L    Urea Nitrogen 19 8 - 22 mg/dL    Creatinine 0.78 0.60 - 1.10 mg/dL    Calcium 9.7 8.5 - 10.5 mg/dL    Glucose 95 70 - 125 mg/dL    Alkaline Phosphatase 77 45 - 120 U/L    AST 18 0 - 40 U/L    ALT 12 0 - 45 U/L    Protein Total 7.4 6.0 - 8.0 g/dL    Albumin 3.7 3.5 - 5.0 g/dL    Bilirubin Total 0.5 0.0 - 1.0 mg/dL    GFR Estimate 85 >60 mL/min/1.73m2      Comment:      Effective December 21, 2021 eGFRcr in adults is calculated using the 2021 CKD-EPI creatinine equation which includes age and gender (Jayshree samuels al., NEJ, DOI: 10.1056/SRAVsf9953311)   ESR: Erythrocyte sedimentation rate   Result Value Ref Range    Erythrocyte Sedimentation Rate 9 0 - 20 mm/hr   CRP, inflammation   Result Value Ref Range    CRP 0.2 0.0 - 0.8 mg/dL   TSH with free T4 reflex   Result Value Ref Range    TSH 1.42 0.30 - 5.00 uIU/mL   Vitamin D deficiency screening   Result Value Ref Range    Vitamin D, Total (25-Hydroxy) 26 20 - 75 ug/L    Narrative    Season, race, dietary intake, and treatment affect the concentration of 25-hydroxy-Vitamin D. Values may decrease during winter months and increase during summer months.  Values 20-29 ug/L may indicate Vitamin D insufficiency and values <20 ug/L may indicate Vitamin D deficiency.    Vitamin D determination is routinely performed by an immunoassay specific for 25 hydroxyvitamin D3.  If an individual is on vitamin D2(ergocalciferol) supplementation, please specify 25 OH vitamin D2 and D3 level determination by LCMSMS test VITD23.     CBC with platelets and differential   Result Value Ref Range    WBC Count 7.2 4.0 - 11.0 10e3/uL    RBC Count 4.93 3.80 - 5.20 10e6/uL    Hemoglobin 14.4 11.7 - 15.7 g/dL    Hematocrit 43.9 35.0 - 47.0 %    MCV 89 78 - 100 fL    MCH 29.2 26.5 - 33.0 pg    MCHC 32.8 31.5 - 36.5 g/dL    RDW 11.8 10.0 - 15.0 %    Platelet Count 293 150 - 450 10e3/uL    % Neutrophils 70 %    % Lymphocytes 22 %    % Monocytes 6 %    % Eosinophils 1 %    % Basophils 1 %    % Immature Granulocytes 0 %    Absolute Neutrophils 5.1 1.6 - 8.3 10e3/uL    Absolute Lymphocytes 1.6 0.8 - 5.3 10e3/uL    Absolute Monocytes 0.4 0.0 - 1.3 10e3/uL    Absolute Eosinophils 0.1 0.0 - 0.7 10e3/uL    Absolute Basophils 0.0 0.0 - 0.2 10e3/uL    Absolute Immature Granulocytes 0.0 <=0.4 10e3/uL   UA with Microscopic reflex to Culture - lab collect   Result Value Ref Range    Color Urine Yellow Colorless, Straw, Light Yellow, Yellow    Appearance Urine Clear Clear    Glucose Urine Negative Negative mg/dL    Bilirubin Urine Negative Negative    Ketones Urine Negative Negative mg/dL    Specific Gravity Urine 1.015 1.005 - 1.030    Blood Urine Trace (A) Negative    pH Urine 6.5 5.0 - 8.0    Protein Albumin Urine Negative Negative mg/dL    Urobilinogen Urine 0.2 0.2, 1.0 E.U./dL    Nitrite Urine Negative Negative    Leukocyte Esterase Urine Small (A) Negative   Urine Microscopic   Result Value Ref Range    Bacteria Urine Few (A) None Seen /HPF    RBC Urine 2-5 (A) 0-2 /HPF /HPF    WBC Urine 0-5 0-5 /HPF /HPF    Squamous Epithelials Urine Few (A) None Seen /LPF    Narrative    Urine Culture not indicated        If you have any questions or concerns, please call the clinic at the number listed above.       Sincerely,      Richmond Francois MD

## 2022-07-08 ENCOUNTER — OFFICE VISIT (OUTPATIENT)
Dept: INTERNAL MEDICINE | Facility: CLINIC | Age: 62
End: 2022-07-08
Payer: COMMERCIAL

## 2022-07-08 VITALS
OXYGEN SATURATION: 98 % | SYSTOLIC BLOOD PRESSURE: 100 MMHG | HEIGHT: 63 IN | HEART RATE: 65 BPM | BODY MASS INDEX: 19.67 KG/M2 | WEIGHT: 111 LBS | DIASTOLIC BLOOD PRESSURE: 68 MMHG

## 2022-07-08 DIAGNOSIS — M79.642 BILATERAL HAND PAIN: ICD-10-CM

## 2022-07-08 DIAGNOSIS — M15.0 PRIMARY OSTEOARTHRITIS INVOLVING MULTIPLE JOINTS: Primary | ICD-10-CM

## 2022-07-08 DIAGNOSIS — L30.9 HAND ECZEMA: ICD-10-CM

## 2022-07-08 DIAGNOSIS — M79.641 BILATERAL HAND PAIN: ICD-10-CM

## 2022-07-08 PROCEDURE — 99213 OFFICE O/P EST LOW 20 MIN: CPT | Performed by: INTERNAL MEDICINE

## 2022-07-08 RX ORDER — CLOBETASOL PROPIONATE 0.5 MG/G
1 OINTMENT TOPICAL 2 TIMES DAILY
COMMUNITY
End: 2022-07-08

## 2022-07-08 RX ORDER — NAPROXEN 500 MG/1
500 TABLET ORAL 2 TIMES DAILY WITH MEALS
Qty: 60 TABLET | Refills: 3 | Status: SHIPPED | OUTPATIENT
Start: 2022-07-08

## 2022-07-08 RX ORDER — CLOBETASOL PROPIONATE 0.5 MG/G
1 OINTMENT TOPICAL 2 TIMES DAILY
Qty: 45 G | Refills: 3 | Status: SHIPPED | OUTPATIENT
Start: 2022-07-08 | End: 2022-07-15 | Stop reason: ALTCHOICE

## 2022-07-08 NOTE — PROGRESS NOTES
Office Visit - Follow Up   Jenni Bucio   61 year old female    Date of Visit: 7/8/2022    Chief Complaint   Patient presents with     Follow Up     3 week follow up - feeling much better - no problems        -------------------------------------------------------------------------------------------------------------------------  Assessment and Plan    Follow-up since our previous meeting of Paola 10, 2022, when we were evaluating symptoms of fatigue, dizziness, intermittent pain right side of the chest, shortness of breath, poor sleeping, and weight loss    Today July 8, 2022, she comes in with her sister and reports feeling much better, about back to normal    I reviewed with her the testing that we did on Paola 10 which really all came back normal, including CBC, metabolic panel, inflammation markers, urinalysis, thyroid test.    She needs a couple of longer-term prescriptions renewed today, namely naproxen for her arthritic joints, and also clobetasol ointment for hand eczema.    She had 1 additional symptom to mention today which is    Itching of the left thigh, probably from allergies, suggested to use over-the-counter antihistamine eyedrops, example brand Zaditor    Stress and anxiety, from some sort of family matter--maybe that was playing a role     Weight loss, I asked her to continue to monitor her weight, and work with either me or nurse practitioner Vernell Carlisle if weight continues to fall.  But I am optimistic that her weight will be okay since she is feeling better  Wt Readings from Last 5 Encounters:   07/08/22 50.3 kg (111 lb)   06/10/22 51.1 kg (112 lb 11.2 oz)   03/26/21 55.3 kg (122 lb)   06/24/20 52.8 kg (116 lb 4.8 oz)   02/05/19 57.8 kg (127 lb 6.4 oz)       Sleep difficulty, which could be from anxiety or stress, but of course we do need to evaluate for potential medical causes     History of elevated cholesterol, with an LDL of 147 measured in 2018  No history of smoking, she reports  alcohol consumption 0     Gastroesophageal reflux, for which she takes famotidine, and she does report some intermittent difficulty with swallowing     Osteoarthritis of her hands, with visible bony hypertrophy that I can see, and also some neck pain which I believe probably represents some degenerative disc disease  Some of this could be overuse from her occupation which is also lot of manipulation of the hands  She has been using naproxen, which I would like her to minimize use, because it can be irritating to the stomach.    Hand eczema, uses topical clobetasol    Vaccinated for COVID-19    Immunization History   Administered Date(s) Administered     COVID-19,PF,Pfizer (12+ Yrs) 03/23/2021, 04/13/2021, 10/22/2021     COVID-19,PF,Pfizer 12+ Yrs (2022 and After) 05/02/2022     DT (PEDS <7y) 03/27/2006     FLU 6-35 months 09/27/2010     Flu, Unspecified 10/22/1998     Influenza Vaccine IM > 6 months Valent IIV4 (Alfuria,Fluzone) 11/24/2020     Influenza Vaccine, 6+MO IM (QUADRIVALENT W/PRESERVATIVES) 09/10/2018     TD (ADULT, 7+) 09/10/2018     Td,adult,historic,unspecified 03/27/2006     Tdap (Adacel,Boostrix) 03/27/2006     Zoster vaccine recombinant adjuvanted (SHINGRIX) 11/24/2020         --------------------------------------------------------------------------------------------------------------------------  History of Present Illness  This 61 year old old     Follow-up since our previous meeting of Paola 10, 2022, when we were evaluating symptoms of fatigue, dizziness, intermittent pain right side of the chest, shortness of breath, poor sleeping, and weight loss    Today July 8, 2022, she comes in with her sister and reports feeling much better, about back to normal    I reviewed with her the testing that we did on Paola 10 which really all came back normal, including CBC, metabolic panel, inflammation markers, urinalysis, thyroid test.    She needs a couple of longer-term prescriptions renewed today, namely  "naproxen for her arthritic joints, and also clobetasol ointment for hand eczema.    She had 1 additional symptom to mention today which is    Itching of the left thigh, probably from allergies, suggested to use over-the-counter antihistamine eyedrops, example brand Zaditor      Wt Readings from Last 3 Encounters:   07/08/22 50.3 kg (111 lb)   06/10/22 51.1 kg (112 lb 11.2 oz)   03/26/21 55.3 kg (122 lb)     BP Readings from Last 3 Encounters:   07/08/22 100/68   06/10/22 112/62   03/26/21 122/80       ---------------------------------------------------------------------------------------------------------------------------    Medications, Allergies, Social, and Problem List   Current Outpatient Medications   Medication Sig Dispense Refill     clobetasol (TEMOVATE) 0.05 % external ointment Apply 1 g topically 2 times daily       famotidine (PEPCID) 20 MG tablet Take 1 tablet (20 mg) by mouth 2 times daily 90 tablet 1     naproxen (NAPROSYN) 500 MG tablet [NAPROXEN (NAPROSYN) 500 MG TABLET] Take 1 tablet (500 mg total) by mouth 2 (two) times a day with meals. 60 tablet 0     No Known Allergies  Social History     Tobacco Use     Smoking status: Never Smoker     Smokeless tobacco: Never Used   Vaping Use     Vaping Use: Never used   Substance Use Topics     Alcohol use: No     Patient Active Problem List   Diagnosis   (none) - all problems resolved or deleted        Reviewed, reconciled and updated       Physical Exam   General Appearance:       /68 (BP Location: Right arm, Patient Position: Sitting, Cuff Size: Adult Regular)   Pulse 65   Ht 1.588 m (5' 2.5\")   Wt 50.3 kg (111 lb)   SpO2 98%   BMI 19.98 kg/m      Appears well, mild eczema on both hands     Additional Information        NAYELI MARTINES MD, MD    "

## 2022-07-08 NOTE — PATIENT INSTRUCTIONS
Follow-up since our previous meeting of Paola 10, 2022, when we were evaluating symptoms of fatigue, dizziness, intermittent pain right side of the chest, shortness of breath, poor sleeping, and weight loss    Today July 8, 2022, she comes in with her sister and reports feeling much better, about back to normal    I reviewed with her the testing that we did on Paola 10 which really all came back normal, including CBC, metabolic panel, inflammation markers, urinalysis, thyroid test.    She needs a couple of longer-term prescriptions renewed today, namely naproxen for her arthritic joints, and also clobetasol ointment for hand eczema.    She had 1 additional symptom to mention today which is    Itching of the left thigh, probably from allergies, suggested to use over-the-counter antihistamine eyedrops, example brand Zaditor    Stress and anxiety, from some sort of family matter--maybe that was playing a role     Weight loss, I asked her to continue to monitor her weight, and work with either me or nurse practitioner Vernell Carlisle if weight continues to fall.  But I am optimistic that her weight will be okay since she is feeling better  Wt Readings from Last 5 Encounters:   07/08/22 50.3 kg (111 lb)   06/10/22 51.1 kg (112 lb 11.2 oz)   03/26/21 55.3 kg (122 lb)   06/24/20 52.8 kg (116 lb 4.8 oz)   02/05/19 57.8 kg (127 lb 6.4 oz)       Sleep difficulty, which could be from anxiety or stress, but of course we do need to evaluate for potential medical causes     History of elevated cholesterol, with an LDL of 147 measured in 2018  No history of smoking, she reports alcohol consumption 0     Gastroesophageal reflux, for which she takes famotidine, and she does report some intermittent difficulty with swallowing     Osteoarthritis of her hands, with visible bony hypertrophy that I can see, and also some neck pain which I believe probably represents some degenerative disc disease  Some of this could be overuse from her  occupation which is also lot of manipulation of the hands  She has been using naproxen, which I would like her to minimize use, because it can be irritating to the stomach.    Hand eczema, uses topical clobetasol    Vaccinated for COVID-19    Immunization History   Administered Date(s) Administered    COVID-19,PF,Pfizer (12+ Yrs) 03/23/2021, 04/13/2021, 10/22/2021    COVID-19,PF,Pfizer 12+ Yrs (2022 and After) 05/02/2022    DT (PEDS <7y) 03/27/2006    FLU 6-35 months 09/27/2010    Flu, Unspecified 10/22/1998    Influenza Vaccine IM > 6 months Valent IIV4 (Alfuria,Fluzone) 11/24/2020    Influenza Vaccine, 6+MO IM (QUADRIVALENT W/PRESERVATIVES) 09/10/2018    TD (ADULT, 7+) 09/10/2018    Td,adult,historic,unspecified 03/27/2006    Tdap (Adacel,Boostrix) 03/27/2006    Zoster vaccine recombinant adjuvanted (SHINGRIX) 11/24/2020

## 2022-07-10 ENCOUNTER — TELEPHONE (OUTPATIENT)
Dept: FAMILY MEDICINE | Facility: CLINIC | Age: 62
End: 2022-07-10

## 2022-07-10 DIAGNOSIS — L30.9 HAND ECZEMA: Primary | ICD-10-CM

## 2022-07-10 NOTE — TELEPHONE ENCOUNTER
clobetasol (TEMOVATE) 0.05 % external ointment is not on plan formulary please switch to an alternative:  Clobetasolemollient, betamethasone dipropa

## 2022-07-11 RX ORDER — BETAMETHASONE DIPROPIONATE 0.5 MG/G
CREAM TOPICAL 2 TIMES DAILY
Qty: 45 G | Refills: 3 | Status: SHIPPED | OUTPATIENT
Start: 2022-07-11

## 2022-07-13 ENCOUNTER — TELEPHONE (OUTPATIENT)
Dept: FAMILY MEDICINE | Facility: CLINIC | Age: 62
End: 2022-07-13

## 2022-07-13 DIAGNOSIS — L30.9 HAND ECZEMA: ICD-10-CM

## 2022-07-13 RX ORDER — CLOBETASOL PROPIONATE 0.5 MG/G
1 OINTMENT TOPICAL 2 TIMES DAILY
Qty: 45 G | Refills: 3 | Status: CANCELLED | OUTPATIENT
Start: 2022-07-13

## 2022-07-13 NOTE — TELEPHONE ENCOUNTER
Prior Authorization Request   Who s requesting:  Pharmacy  Pharmacy Name and Location: [facility]  Medication Name: CLOBETASOL  Insurance Plan: [facility]  Key: [id number]

## 2022-07-15 NOTE — TELEPHONE ENCOUNTER
Central Prior Authorization Team  Phone: [phone]    Follow up call to anette as the insurance information was coming back as patient inactive. They gave me correct insurance information   [id number]  Pcn: [id number]  ID:[id number]  He also stated that a new rx for betamethasone was sent over, he was wondering if this was in replacement of the clobetasol. Routing to provider to confirm.

## 2022-07-19 NOTE — TELEPHONE ENCOUNTER
Central Prior Authorization Team  Phone: [phone]    Provider changed rx, no longer needing pa for clobetasol. Cancelling pa.     Calling pharmacy to inform that betamethasone replaces.

## 2022-08-09 DIAGNOSIS — K21.9 GASTROESOPHAGEAL REFLUX DISEASE WITHOUT ESOPHAGITIS: ICD-10-CM

## 2022-08-09 DIAGNOSIS — K21.00 GASTROESOPHAGEAL REFLUX DISEASE WITH ESOPHAGITIS WITHOUT HEMORRHAGE: ICD-10-CM

## 2022-08-09 RX ORDER — FAMOTIDINE 20 MG/1
TABLET, FILM COATED ORAL
Qty: 180 TABLET | Refills: 3 | OUTPATIENT
Start: 2022-08-09

## 2023-03-17 ENCOUNTER — E-VISIT (OUTPATIENT)
Dept: URGENT CARE | Facility: CLINIC | Age: 63
End: 2023-03-17
Payer: COMMERCIAL

## 2023-03-17 DIAGNOSIS — R30.0 DYSURIA: Primary | ICD-10-CM

## 2023-03-17 PROCEDURE — 99421 OL DIG E/M SVC 5-10 MIN: CPT | Performed by: PREVENTIVE MEDICINE

## 2023-03-17 NOTE — PATIENT INSTRUCTIONS
Dear Jenni Bucio,     After reviewing your responses, I would like you to come in for a urine test to make sure we treat you correctly. This urine test is to evaluate you for a possible urinary tract infection, and should be scheduled for today or tomorrow. Schedule a Lab Only appointment here.     Lab appointments are not available at most locations on the weekends. If no Lab Only appointment is available, you should be seen in any of our convenient Walk-in or Urgent Care Centers, which can be found on our website here.     You will receive instructions with your results in [facility] once they are available.     If your symptoms worsen, you develop pain in your back or stomach, develop fevers, or are not improving in 5 days, please contact your primary care provider for an appointment or visit a Walk-in or Urgent Care Center to be seen.     Thanks again for choosing us as your health care partner,     Des Theodore MD, MD    Dysuria with Uncertain Cause (Adult)    The urethra is the tube that allows urine to pass out of the body. In a woman, the urethra is the opening above the vagina. In men, the urethra is the opening on the tip of the penis. Dysuria is the feeling of pain or burning in the urethra when passing urine.   Dysuria can be caused by anything that irritates or inflames the urethra. An infection or chemical irritation can cause this reaction. A bladder infection is the most common cause of dysuria in adults. A urine test can diagnose this. A bladder infection needs antibiotic treatment.   Soaps, lotions, colognes, and feminine hygiene products can cause dysuria. So can birth control jellies, creams, and foams. It will go away 1 to 3 days after discontinuing the use of these irritants.   Sexually transmitted infections (STIs), such as chlamydia or gonorrhea, can cause dysuria. Your healthcare provider may take a culture sample. Your provider may start you on antibiotic medicine  before the culture test returns.   In women who have gone through menopause, dysuria can be from dryness in the lining of the urethra. This can be treated with hormones. Dysuria becomes long-term (chronic) when it lasts for weeks or months. You may need to see a specialist (urologist) to diagnose and treat chronic dysuria.   Home care  These home care tips may help:    Don't use any chemicals or products that you think may be causing your symptoms.    If you were given a prescription medicine, take as directed. Take it until it's all used up.    If a culture was taken, don't have sex until you have been told that it is negative. A negative culture means you don't have an infection. Then follow your healthcare provider's advice to treat your condition.  If a culture was done and it is positive:     Both you and your sexual partner may need to be treated. This is true even if your partner has no symptoms.    Contact your healthcare provider or go to an urgent care clinic or the public health department to be looked at and treated.    Don't have sex until both you and your partner have finished all antibiotics and your healthcare provider says you are no longer contagious.    Learn about and use safe sex practices. The safest sex is with a partner who has tested negative and only has sex with you. Condoms can prevent STIs from spreading, but they aren't a guarantee.  Follow-up care  Follow up with your healthcare provider, or as advised. If a culture was taken, you may call as directed for the results. If you have an STI, follow up with your provider or the public health department for a complete STI screening, including HIV testing. For more information, contact CDC-INFO at 387-183-0616.   When to call your healthcare provider   Call your healthcare provider right away if any of these occur:    You aren't better after 3 days of treatment    Fever of 100.4 F (38 C) or higher, or as directed by your healthcare  provider    Back or belly pain that gets worse    You can't urinate because of pain    New discharge from the urethra, vagina, or penis    Painful sores on the penis    Rash or joint pain    Painful lumps (lymph nodes) in the groin    Testicle pain or swelling of the scrotum  Carmelita last reviewed this educational content on 6/1/2022 2000-2022 [employer]. All rights reserved. This information is not intended as a substitute for professional medical care. Always follow your healthcare professional's instructions.

## 2023-03-28 ENCOUNTER — APPOINTMENT (OUTPATIENT)
Dept: MRI IMAGING | Facility: CLINIC | Age: 63
End: 2023-03-28
Attending: EMERGENCY MEDICINE
Payer: COMMERCIAL

## 2023-03-28 ENCOUNTER — HOSPITAL ENCOUNTER (EMERGENCY)
Facility: CLINIC | Age: 63
Discharge: HOME | End: 2023-03-28
Attending: EMERGENCY MEDICINE | Admitting: EMERGENCY MEDICINE
Payer: COMMERCIAL

## 2023-03-28 VITALS
WEIGHT: 110 LBS | OXYGEN SATURATION: 100 % | SYSTOLIC BLOOD PRESSURE: 118 MMHG | RESPIRATION RATE: 20 BRPM | TEMPERATURE: 96.9 F | DIASTOLIC BLOOD PRESSURE: 59 MMHG | BODY MASS INDEX: 20.77 KG/M2 | HEIGHT: 61 IN | HEART RATE: 71 BPM

## 2023-03-28 DIAGNOSIS — R42 VERTIGO: ICD-10-CM

## 2023-03-28 LAB
ANION GAP SERPL CALCULATED.3IONS-SCNC: 12 MMOL/L (ref 5–18)
ATRIAL RATE - MUSE: 86 BPM
BASOPHILS # BLD AUTO: 0 10E3/UL (ref 0–0.2)
BASOPHILS NFR BLD AUTO: 0 %
BUN SERPL-MCNC: 21 MG/DL (ref 8–22)
CALCIUM SERPL-MCNC: 9.1 MG/DL (ref 8.5–10.5)
CHLORIDE BLD-SCNC: 106 MMOL/L (ref 98–107)
CO2 SERPL-SCNC: 22 MMOL/L (ref 22–31)
CREAT SERPL-MCNC: 0.77 MG/DL (ref 0.6–1.1)
DIASTOLIC BLOOD PRESSURE - MUSE: 53 MMHG
EOSINOPHIL # BLD AUTO: 0 10E3/UL (ref 0–0.7)
EOSINOPHIL NFR BLD AUTO: 0 %
ERYTHROCYTE [DISTWIDTH] IN BLOOD BY AUTOMATED COUNT: 11.9 % (ref 10–15)
GFR SERPL CREATININE-BSD FRML MDRD: 86 ML/MIN/1.73M2
GLUCOSE BLD-MCNC: 152 MG/DL (ref 70–125)
GLUCOSE BLDC GLUCOMTR-MCNC: 178 MG/DL (ref 70–99)
HCT VFR BLD AUTO: 41.5 % (ref 35–47)
HGB BLD-MCNC: 14 G/DL (ref 11.7–15.7)
IMM GRANULOCYTES # BLD: 0 10E3/UL
IMM GRANULOCYTES NFR BLD: 0 %
INTERPRETATION ECG - MUSE: NORMAL
LYMPHOCYTES # BLD AUTO: 2.8 10E3/UL (ref 0.8–5.3)
LYMPHOCYTES NFR BLD AUTO: 26 %
MAGNESIUM SERPL-MCNC: 2 MG/DL (ref 1.8–2.6)
MCH RBC QN AUTO: 28.8 PG (ref 26.5–33)
MCHC RBC AUTO-ENTMCNC: 33.7 G/DL (ref 31.5–36.5)
MCV RBC AUTO: 85 FL (ref 78–100)
MONOCYTES # BLD AUTO: 0.5 10E3/UL (ref 0–1.3)
MONOCYTES NFR BLD AUTO: 5 %
NEUTROPHILS # BLD AUTO: 7.1 10E3/UL (ref 1.6–8.3)
NEUTROPHILS NFR BLD AUTO: 69 %
NRBC # BLD AUTO: 0 10E3/UL
NRBC BLD AUTO-RTO: 0 /100
P AXIS - MUSE: 81 DEGREES
PLATELET # BLD AUTO: 354 10E3/UL (ref 150–450)
POTASSIUM BLD-SCNC: 3.2 MMOL/L (ref 3.5–5)
PR INTERVAL - MUSE: 140 MS
QRS DURATION - MUSE: 72 MS
QT - MUSE: 422 MS
QTC - MUSE: 504 MS
R AXIS - MUSE: 51 DEGREES
RBC # BLD AUTO: 4.86 10E6/UL (ref 3.8–5.2)
SODIUM SERPL-SCNC: 140 MMOL/L (ref 136–145)
SYSTOLIC BLOOD PRESSURE - MUSE: 114 MMHG
T AXIS - MUSE: 37 DEGREES
TROPONIN I SERPL-MCNC: <0.01 NG/ML (ref 0–0.29)
VENTRICULAR RATE- MUSE: 86 BPM
WBC # BLD AUTO: 10.5 10E3/UL (ref 4–11)

## 2023-03-28 PROCEDURE — 96374 THER/PROPH/DIAG INJ IV PUSH: CPT | Mod: 59

## 2023-03-28 PROCEDURE — 36415 COLL VENOUS BLD VENIPUNCTURE: CPT | Performed by: FAMILY MEDICINE

## 2023-03-28 PROCEDURE — 70553 MRI BRAIN STEM W/O & W/DYE: CPT

## 2023-03-28 PROCEDURE — 99285 EMERGENCY DEPT VISIT HI MDM: CPT | Mod: 25

## 2023-03-28 PROCEDURE — 82962 GLUCOSE BLOOD TEST: CPT

## 2023-03-28 PROCEDURE — A9585 GADOBUTROL INJECTION: HCPCS | Performed by: EMERGENCY MEDICINE

## 2023-03-28 PROCEDURE — 70544 MR ANGIOGRAPHY HEAD W/O DYE: CPT

## 2023-03-28 PROCEDURE — 250N000011 HC RX IP 250 OP 636: Performed by: FAMILY MEDICINE

## 2023-03-28 PROCEDURE — 255N000002 HC RX 255 OP 636: Performed by: EMERGENCY MEDICINE

## 2023-03-28 PROCEDURE — 83735 ASSAY OF MAGNESIUM: CPT | Performed by: FAMILY MEDICINE

## 2023-03-28 PROCEDURE — 250N000013 HC RX MED GY IP 250 OP 250 PS 637: Performed by: FAMILY MEDICINE

## 2023-03-28 PROCEDURE — 80048 BASIC METABOLIC PNL TOTAL CA: CPT | Performed by: FAMILY MEDICINE

## 2023-03-28 PROCEDURE — 84484 ASSAY OF TROPONIN QUANT: CPT | Performed by: EMERGENCY MEDICINE

## 2023-03-28 PROCEDURE — 93005 ELECTROCARDIOGRAM TRACING: CPT | Performed by: EMERGENCY MEDICINE

## 2023-03-28 PROCEDURE — 70549 MR ANGIOGRAPH NECK W/O&W/DYE: CPT

## 2023-03-28 PROCEDURE — 85025 COMPLETE CBC W/AUTO DIFF WBC: CPT | Performed by: FAMILY MEDICINE

## 2023-03-28 RX ORDER — ONDANSETRON 4 MG/1
4 TABLET, ORALLY DISINTEGRATING ORAL EVERY 8 HOURS PRN
Qty: 20 TABLET | Refills: 0 | Status: SHIPPED | OUTPATIENT
Start: 2023-03-28

## 2023-03-28 RX ORDER — ONDANSETRON 2 MG/ML
4 INJECTION INTRAMUSCULAR; INTRAVENOUS ONCE
Status: COMPLETED | OUTPATIENT
Start: 2023-03-28 | End: 2023-03-28

## 2023-03-28 RX ORDER — GADOBUTROL 604.72 MG/ML
10 INJECTION INTRAVENOUS ONCE
Status: COMPLETED | OUTPATIENT
Start: 2023-03-28 | End: 2023-03-28

## 2023-03-28 RX ORDER — MECLIZINE HYDROCHLORIDE 25 MG/1
25 TABLET ORAL 3 TIMES DAILY PRN
Qty: 20 TABLET | Refills: 0 | Status: SHIPPED | OUTPATIENT
Start: 2023-03-28

## 2023-03-28 RX ORDER — MECLIZINE HYDROCHLORIDE 25 MG/1
25 TABLET ORAL ONCE
Status: COMPLETED | OUTPATIENT
Start: 2023-03-28 | End: 2023-03-28

## 2023-03-28 RX ADMIN — GADOBUTROL 10 ML: 604.72 INJECTION INTRAVENOUS at 17:33

## 2023-03-28 RX ADMIN — MECLIZINE HYDROCHLORIDE 25 MG: 25 TABLET ORAL at 15:12

## 2023-03-28 RX ADMIN — ONDANSETRON 4 MG: 2 INJECTION INTRAMUSCULAR; INTRAVENOUS at 15:00

## 2023-03-28 ASSESSMENT — ENCOUNTER SYMPTOMS
DIARRHEA: 0
NAUSEA: 1
LIGHT-HEADEDNESS: 1
DIZZINESS: 1
VOMITING: 1

## 2023-03-28 ASSESSMENT — ACTIVITIES OF DAILY LIVING (ADL)
ADLS_ACUITY_SCORE: 35

## 2023-03-28 NOTE — ED PROVIDER NOTES
EMERGENCY DEPARTMENT ENCOUNTER      NAME: Jenni Bucio  AGE: 62 year old female  YOB: 1960  MRN: [id number]  EVALUATION DATE & TIME: 3/28/2023  2:49 PM    PCP: Vernell Wheatley    ED PROVIDER: Remi Elias MD        Chief Complaint   Patient presents with     Dizziness     Nausea & Vomiting         FINAL IMPRESSION:  1. Vertigo          ED COURSE & MEDICAL DECISION MAKIN:44 PM I introduced myself to the patient, obtained patient history, and performed a physical exam. I also updated them on laboratory and imaging results. We discussed the plan for discharge and the patient is agreeable. Reviewed supportive cares, symptomatic treatment, outpatient follow up, and reasons to return to the Emergency Department. Patient to be discharged by ED RN.   8:00 PM Patient is set to be discharged.     Pertinent Labs & Imaging studies reviewed. (See chart for details)  62 year old female presents to the Emergency Department for evaluation of dizziness and problems walking developed last night at 9 PM.  Symptoms seem to have gone away last night but then returned this morning.  Symptoms seem to be only aggravated by movement to head therefore stroke code not initiated since this seems more likely benign positional vertigo    Differential diagnosis includes benign proximal positional vertigo, vestibular neuritis, laryngitis, fistula, otitis media, aminoglycoside toxicity, TIA, stroke, tumor, multiple sclerosis etc.    We will obtain EKG, BMP, magnesium, troponin, CBC, MR stroke protocol since over age 50    Given Zofran and meclizine    Patient states he feels better.  At rest she has no symptoms most when she moves her head she has symptoms    MRI without stroke, tumor, or high-grade stenosis    Likely benign positional vertigo.  Recommend follow-up with primary care doctor, [place] [facility], and will send in prescription for meclizine and Zofran           Medical Decision  Making    History:    Supplemental history from: Documented in chart, if applicable and Family Member/Significant Other    External Record(s) reviewed: Documented in chart, if applicable.    Work Up:    Chart documentation includes differential considered and any EKGs or imaging independently interpreted by provider, where specified.    In additional to work up documented, I considered the following work up: Documented in chart, if applicable.    External consultation:    Discussion of management with another provider: Documented in chart, if applicable    Complicating factors:    Care impacted by chronic illness: N/A    Care affected by social determinants of health: N/A    Disposition considerations: Discharge. I prescribed additional prescription strength medication(s) as charted. N/A.          Voice recognition software was used in the creation of this note. Any grammatical or nonsensical errors are due to inherent errors with the software and are not the intention of the writer.         At the conclusion of the encounter I discussed the results of all of the tests and the disposition. The questions were answered. The patient or family acknowledged understanding and was agreeable with the care plan.               MEDICATIONS GIVEN IN THE EMERGENCY:  Medications   ondansetron (ZOFRAN) injection 4 mg (4 mg Intravenous $Given 3/28/23 1500)   meclizine (ANTIVERT) tablet 25 mg (25 mg Oral $Given 3/28/23 1512)   gadobutrol (GADAVIST) injection 10 mL (10 mLs Intravenous $Given 3/28/23 8843)       NEW PRESCRIPTIONS STARTED AT TODAY'S ER VISIT  Discharge Medication List as of 3/28/2023  8:03 PM      START taking these medications    Details   meclizine (ANTIVERT) 25 MG tablet Take 1 tablet (25 mg) by mouth 3 times daily as needed for dizziness, Disp-20 tablet, R-0, Local Print      ondansetron (ZOFRAN ODT) 4 MG ODT tab Take 1 tablet (4 mg) by mouth every 8 hours as needed for nausea, Disp-20 tablet, R-0, Local Print     "            =================================================================    HPI    Triage note  \"Pt here with dizziness that started last evening around 2100. Went away enough where pt could go to work this am and symptoms came back around 10 am. Pt seen by Dr Mchugh in triage. Pt brought to room 19 and placed on cardiac monitor. Triage done with son helping,  waiver done using  [employer], [employer]  number [id number]      \"      Patient information was obtained from: Patient and Son     Use of : N/A       Jenni Bucio is a 62 year old female with no pertinent medical history who presents to this ED via walk in for evaluation of dizziness, nausea and vomiting.    Patient started having lightheadedness along with dizziness and vomiting last night. She woke up this morning feeling fine and went to work, but started to feel light headed and nauseous throughout the day which prompted her to present to the ED for further evaluation. Son reports that patient was unable to ambulate to the car and needed assistance from him. The dizziness is worse when patient moves her head side to side and when opening her eyes. Patient denies any chest pain, diarrhea, or ear pain. She's not on any medications. Denies a history of stroke.       REVIEW OF SYSTEMS   Review of Systems   HENT: Negative for ear pain.    Cardiovascular: Negative for chest pain.   Gastrointestinal: Positive for nausea and vomiting. Negative for diarrhea.   Neurological: Positive for dizziness and light-headedness.       PAST MEDICAL HISTORY:  No past medical history on file.    PAST SURGICAL HISTORY:  No past surgical history on file.        CURRENT MEDICATIONS:    meclizine (ANTIVERT) 25 MG tablet  ondansetron (ZOFRAN ODT) 4 MG ODT tab  betamethasone dipropionate (DIPROSONE) 0.05 % external cream  famotidine (PEPCID) 20 MG tablet  naproxen (NAPROSYN) 500 MG tablet        ALLERGIES:  No Known Allergies    FAMILY " "HISTORY:  No family history on file.    SOCIAL HISTORY:   Social History     Socioeconomic History     Marital status: Single   Tobacco Use     Smoking status: Never     Smokeless tobacco: Never   Vaping Use     Vaping Use: Never used   Substance and Sexual Activity     Alcohol use: No     Sexual activity: Never       VITALS:  /59   Pulse 71   Temp 96.9  F (36.1  C)   Resp 20   Ht 1.549 m (5' 1\")   Wt 49.9 kg (110 lb)   SpO2 100%   BMI 20.78 kg/m      PHYSICAL EXAM      Vitals: /59   Pulse 71   Temp 96.9  F (36.1  C)   Resp 20   Ht 1.549 m (5' 1\")   Wt 49.9 kg (110 lb)   SpO2 100%   BMI 20.78 kg/m    General: Appears in no acute distress, awake, alert, interactive.  Eyes: Conjunctivae non-injected. Sclera anicteric. EOMI  HENT: Atraumatic. Moving head side to side aggravates her symptoms.   Neck: Supple.  Respiratory/Chest: Respiration unlabored.  No wheezing or crackles  Heart: Regular rate and rhythm  Abdomen: non distended. Soft, non tender.   Musculoskeletal: Normal extremities. No edema or erythema.  Skin: Normal color. No rash or diaphoresis.  Neurologic: Face symmetric, moves all extremities spontaneously. Speech clear.   Psychiatric: Oriented to person, place, and time. Affect appropriate.    National Institutes of Health Stroke Scale  Exam Interval: Baseline   Score    Level of consciousness: (0)   Alert, keenly responsive    LOC questions: (0)   Answers both questions correctly    LOC commands: (0)   Performs both tasks correctly    Best gaze: (0)   Normal    Visual: (0)   No visual loss    Facial palsy: (0)   Normal symmetrical movements    Motor arm (left): (0)   No drift    Motor arm (right): (0)   No drift    Motor leg (left): (0)   No drift    Motor leg (right): (0)   No drift    Limb ataxia: (0)   Absent    Sensory: (0)   Normal- no sensory loss    Best language: (0)   Normal- no aphasia    Dysarthria: (0)   Normal    Extinction and inattention: (0)   No abnormality        " Total Score:  0          LAB:  All pertinent labs reviewed and interpreted.  Results for orders placed or performed during the hospital encounter of 03/28/23   MR Brain w/o & w Contrast    Impression    IMPRESSION:  HEAD MRI:   1.  No discrete mass lesion, hemorrhage or focal area suggestive of acute ischemia.  2.  No abnormal enhancement.  3.  Minimal small vessel ischemic disease.    HEAD MRA:   1.  No discrete vessel occlusion, significant stenosis, aneurysm or high flow vascular malformation involving the arteries of the [place].    NECK MRA:  1.  Normal configuration of the great vessels off the aortic arch with no significant stenosis of their origins.  2.  No significant stenosis or irregularity involving the arteries of the neck.  3.  No radiographic evidence of dissection.   MRA Brain ([place] wo Contrast    Impression    IMPRESSION:  HEAD MRI:   1.  No discrete mass lesion, hemorrhage or focal area suggestive of acute ischemia.  2.  No abnormal enhancement.  3.  Minimal small vessel ischemic disease.    HEAD MRA:   1.  No discrete vessel occlusion, significant stenosis, aneurysm or high flow vascular malformation involving the arteries of the [place].    NECK MRA:  1.  Normal configuration of the great vessels off the aortic arch with no significant stenosis of their origins.  2.  No significant stenosis or irregularity involving the arteries of the neck.  3.  No radiographic evidence of dissection.   MRA Neck (Carotids) wo & w Contrast    Impression    IMPRESSION:  HEAD MRI:   1.  No discrete mass lesion, hemorrhage or focal area suggestive of acute ischemia.  2.  No abnormal enhancement.  3.  Minimal small vessel ischemic disease.    HEAD MRA:   1.  No discrete vessel occlusion, significant stenosis, aneurysm or high flow vascular malformation involving the arteries of the [place].    NECK MRA:  1.  Normal configuration of the great vessels off the aortic arch with no  significant stenosis of their origins.  2.  No significant stenosis or irregularity involving the arteries of the neck.  3.  No radiographic evidence of dissection.   Basic metabolic panel   Result Value Ref Range    Sodium 140 136 - 145 mmol/L    Potassium 3.2 (L) 3.5 - 5.0 mmol/L    Chloride 106 98 - 107 mmol/L    Carbon Dioxide (CO2) 22 22 - 31 mmol/L    Anion Gap 12 5 - 18 mmol/L    Urea Nitrogen 21 8 - 22 mg/dL    Creatinine 0.77 0.60 - 1.10 mg/dL    Calcium 9.1 8.5 - 10.5 mg/dL    Glucose 152 (H) 70 - 125 mg/dL    GFR Estimate 86 >60 mL/min/1.73m2   Result Value Ref Range    Magnesium 2.0 1.8 - 2.6 mg/dL   Glucose by meter   Result Value Ref Range    GLUCOSE BY METER POCT 178 (H) 70 - 99 mg/dL   CBC with platelets and differential   Result Value Ref Range    WBC Count 10.5 4.0 - 11.0 10e3/uL    RBC Count 4.86 3.80 - 5.20 10e6/uL    Hemoglobin 14.0 11.7 - 15.7 g/dL    Hematocrit 41.5 35.0 - 47.0 %    MCV 85 78 - 100 fL    MCH 28.8 26.5 - 33.0 pg    MCHC 33.7 31.5 - 36.5 g/dL    RDW 11.9 10.0 - 15.0 %    Platelet Count 354 150 - 450 10e3/uL    % Neutrophils 69 %    % Lymphocytes 26 %    % Monocytes 5 %    % Eosinophils 0 %    % Basophils 0 %    % Immature Granulocytes 0 %    NRBCs per 100 WBC 0 <1 /100    Absolute Neutrophils 7.1 1.6 - 8.3 10e3/uL    Absolute Lymphocytes 2.8 0.8 - 5.3 10e3/uL    Absolute Monocytes 0.5 0.0 - 1.3 10e3/uL    Absolute Eosinophils 0.0 0.0 - 0.7 10e3/uL    Absolute Basophils 0.0 0.0 - 0.2 10e3/uL    Absolute Immature Granulocytes 0.0 <=0.4 10e3/uL    Absolute NRBCs 0.0 10e3/uL   Result Value Ref Range    Troponin I <0.01 0.00 - 0.29 ng/mL   ECG 12-LEAD WITH MUSE ([facility])   Result Value Ref Range    Systolic Blood Pressure 114 mmHg    Diastolic Blood Pressure 53 mmHg    Ventricular Rate 86 BPM    Atrial Rate 86 BPM    AL Interval 140 ms    QRS Duration 72 ms     ms    QTc 504 ms    P Axis 81 degrees    R AXIS 51 degrees    T Axis 37 degrees    Interpretation ECG       Sinus  rhythm  Nonspecific T wave abnormality  Abnormal ECG  When compared with ECG of 10-YONI-2022 09:00,  Non-specific change in ST segment in Inferior leads  Nonspecific T wave abnormality now evident in Anterior leads  QT has lengthened  Confirmed by SEE ED PROVIDER NOTE FOR, ECG INTERPRETATION ([id number]),  ARDEN DOOLEY ([id number]) on 3/28/2023 6:58:07 PM         RADIOLOGY:  Reviewed all pertinent imaging. Please see official radiology report.  MRA Neck (Carotids) wo & w Contrast   Final Result   IMPRESSION:   HEAD MRI:    1.  No discrete mass lesion, hemorrhage or focal area suggestive of acute ischemia.   2.  No abnormal enhancement.   3.  Minimal small vessel ischemic disease.      HEAD MRA:    1.  No discrete vessel occlusion, significant stenosis, aneurysm or high flow vascular malformation involving the arteries of the [place].      NECK MRA:   1.  Normal configuration of the great vessels off the aortic arch with no significant stenosis of their origins.   2.  No significant stenosis or irregularity involving the arteries of the neck.   3.  No radiographic evidence of dissection.      MRA Brain ([place]) wo Contrast   Final Result   IMPRESSION:   HEAD MRI:    1.  No discrete mass lesion, hemorrhage or focal area suggestive of acute ischemia.   2.  No abnormal enhancement.   3.  Minimal small vessel ischemic disease.      HEAD MRA:    1.  No discrete vessel occlusion, significant stenosis, aneurysm or high flow vascular malformation involving the arteries of the [place].      NECK MRA:   1.  Normal configuration of the great vessels off the aortic arch with no significant stenosis of their origins.   2.  No significant stenosis or irregularity involving the arteries of the neck.   3.  No radiographic evidence of dissection.      MR Brain w/o & w Contrast   Final Result   IMPRESSION:   HEAD MRI:    1.  No discrete mass lesion, hemorrhage or focal area suggestive of acute ischemia.   2.  No  abnormal enhancement.   3.  Minimal small vessel ischemic disease.      HEAD MRA:    1.  No discrete vessel occlusion, significant stenosis, aneurysm or high flow vascular malformation involving the arteries of the [place].      NECK MRA:   1.  Normal configuration of the great vessels off the aortic arch with no significant stenosis of their origins.   2.  No significant stenosis or irregularity involving the arteries of the neck.   3.  No radiographic evidence of dissection.          EKG:    Performed at: 3/28/2023 15:12    Impression: Sinus rhythm, nonspecific T wave abnormality, when compared with ECG of 6/10/2022 9:00, Non-specific change in ST segment in Inferior leads, Nonspecific T wave abnormality now evident in Anterior leads, QT has lengthened     Rate: 86 BPM   Rhythm: Sinus rhythm   Axis: 51   MA Interval: 140 ms   QRS Interval: 72 ms   QTc Interval: 504 ms   ST Changes: N/A  Comparison: 6/10/2022     I have independently reviewed and interpreted the EKG(s) documented above.    PROCEDURES:         I, Ana Paula Francois, am serving as a scribe to document services personally performed by Bill Elias MD based on my observation and the provider's statements to me. I, Dr. Bill Elias, attest that Ana Paula Francois is acting in a scribe capacity, has observed my performance of the services and has documented them in accordance with my direction.    Bill Elias MD  Emergency Medicine  [facility] EMERGENCY ROOM  [address] [address]  [phone]     Bill Elias MD  03/28/23 6235

## 2023-03-28 NOTE — ED NOTES
MRI check list completed with patient and patient's son interpreting. Pt refused hospital  services. ....Mi Alcantar RN

## 2023-03-28 NOTE — ED TRIAGE NOTES
Pt here with dizziness that started last evening around 2100. Went away enough where pt could go to work this am and symptoms came back around 10 am. Pt seen by Dr Mchugh in triage. Pt brought to room 19 and placed on cardiac monitor. Triage done with son helping,  waiver done using  [employer]  number [id number]

## 2023-03-29 ENCOUNTER — TELEPHONE (OUTPATIENT)
Dept: FAMILY MEDICINE | Facility: CLINIC | Age: 63
End: 2023-03-29
Payer: COMMERCIAL

## 2023-03-29 NOTE — DISCHARGE INSTRUCTIONS
As we discussed this is likely benign positional vertigo.  Recommend Zofran every 8 hours needed for nausea.  Meclizine 3 times daily as needed.  Follow-up with [place] [facility] and your primary care doctor.  Your MRI does not show a stroke.  Your EKG is reassuring.  Return to the ER for worsening symptoms

## 2023-03-29 NOTE — TELEPHONE ENCOUNTER
Attempted to contact Pt for ER follow-up.   No answer, LMTCB.   [place]  #[phone] assisted with call.      If patient returns call, please send to available RN to complete ER follow-up.      Thank you,   Nilda KNOWLES

## 2023-05-14 ENCOUNTER — HEALTH MAINTENANCE LETTER (OUTPATIENT)
Age: 63
End: 2023-05-14

## 2024-07-21 ENCOUNTER — HEALTH MAINTENANCE LETTER (OUTPATIENT)
Age: 64
End: 2024-07-21

## 2024-09-29 ENCOUNTER — HEALTH MAINTENANCE LETTER (OUTPATIENT)
Age: 64
End: 2024-09-29

## 2025-03-31 ENCOUNTER — PATIENT OUTREACH (OUTPATIENT)
Dept: CARE COORDINATION | Facility: CLINIC | Age: 65
End: 2025-03-31
Payer: COMMERCIAL

## 2025-05-18 ENCOUNTER — HEALTH MAINTENANCE LETTER (OUTPATIENT)
Age: 65
End: 2025-05-18

## 2025-05-23 ENCOUNTER — TRANSFERRED RECORDS (OUTPATIENT)
Dept: ADMINISTRATIVE | Facility: CLINIC | Age: 65
End: 2025-05-23
Payer: COMMERCIAL

## 2025-05-27 ENCOUNTER — PATIENT OUTREACH (OUTPATIENT)
Dept: GASTROENTEROLOGY | Facility: CLINIC | Age: 65
End: 2025-05-27
Payer: COMMERCIAL

## 2025-05-27 PROBLEM — D12.6 ADENOMATOUS COLON POLYP: Status: ACTIVE | Noted: 2025-05-27

## 2025-05-27 NOTE — PROCEDURES
[facility]   [address], [address], [place] [address]     Patient Name: Nanette Bucio  Gender:  Female  Exam Date: 05/23/2025 Visit Number:  [id number]  Age: 64 Years YOB: 1960  Attending MD: Ayan Mix MD Medical Record#:  [id number]    Procedure: Colonoscopy   Indications: Colorectal cancer screening      Referring MD: Vernell Wheatley NP  Primary MD:      Vernell Wheatley NP  Medications: Admitting Medications:   0.9% Normal Saline at TKO   Intra Procedure Medications:   Patient received monitored anesthesia care.     Complications: No immediate complications  ______________________________________________________________________________  Procedure:   An examination of the heart and lungs was performed and found to be within acceptable limits.  .  The patient was therefore deemed a reasonable candidate for endoscopy and sedation.   The risks and benefits of the procedure were explained to the patient.After obtaining informed consent, the patient received monitored anesthesia care and I passed the scope   without difficulty via the rectum to the cecum.  The appendiceal orifice and ic valve were identified.  The scope was retroflexed during the examination  The quality of the prep was good  (Miralax/Gatorade/2 tablets Bisacodyl/Magnesium Citrate).    This was a complete examination throughout the entire colon.    Findings:    Polyp location: ascending colon.  Quantity: 4.  Size: 3-6 mm.  Polyp shape:  sessile.         Maneuver: polypectomy was performed with a cold snare.       Removal:  complete.  Retrieval: complete.  Bleeding: none.    Polyp location: descending colon.  Quantity: 1.  Size: 3 mm.  Polyp shape: sessile.         Maneuver: polypectomy was performed with a  cold snare.       Removal: complete.  Retrieval: complete.  Bleeding: none.    Diverticulosis.  Location: - ascending colon - transverse colon.        Quantity:  few.  No inflammation present.    Hemorrhoids.   Internal hemorrhoids without bleeding.    Impression:  Screening Colonoscopy  Colorectal polyps  Hemorrhoids, internal    Preliminary Plan:  The patient and their physician will receive a copy of the pathology report as well as pathology-based recommendations for future screening or surveillance.  Pathology Results:  A: COLON, ASCENDING, POLYPS:           1. Tubular adenomas (2)           2. Negative for high grade dysplasia           3. Per the colonoscopy report:               a. Polyp sizes: 3 mm - 6 mm               b. Resection: Complete               c. Retrieval: Complete      B: COLON, DESCENDING, POLYP:           1. Tubular adenoma           2. Negative for high grade dysplasia           3. Per the colonoscopy report:               a. Polyp size: 3 mm               b. Resection: Complete               c. Retrieval: Complete      MICROSCOPIC  A: Performed   B: Performed     Electronically signed by: Audie Paez MD    Interpreted at [facility], [address] [place] [id number]    Orders    Instruction(s)/Education:  Instruction/Education Timeframe Assessment   Colon Cancer Prevention  K63.5   Colon Polyps  K63.5   Diverticulosis/Diverticulitis  K63.5   Hemorrhoids  K63.5   High Fiber Diet  K63.5       Final Plan:  Repeat colonoscopy in 3 years.    We will attempt to contact you at appropriate intervals via [place] mail.  We may not be able to find you or contact you at that time, therefore you should know that the responsibility for following our recommendation rests with you.  If you don't hear from us at the time your procedure is due, please contact our office to schedule an appointment.  If your contact information should change, please contact our office so that we can update your record.      _Electronically signed by:___________________  Ayan Mix MD                 05/23/2025    cc: Vrenell Wheatley NP  cc: Vernell Wheatley NP